# Patient Record
Sex: MALE | Race: WHITE | Employment: FULL TIME | URBAN - METROPOLITAN AREA
[De-identification: names, ages, dates, MRNs, and addresses within clinical notes are randomized per-mention and may not be internally consistent; named-entity substitution may affect disease eponyms.]

---

## 2017-02-27 RX ORDER — AMLODIPINE BESYLATE 2.5 MG/1
TABLET ORAL
Qty: 90 TABLET | Refills: 0 | Status: SHIPPED | OUTPATIENT
Start: 2017-02-27 | End: 2017-03-14

## 2017-03-14 ENCOUNTER — OFFICE VISIT (OUTPATIENT)
Dept: FAMILY MEDICINE CLINIC | Facility: CLINIC | Age: 62
End: 2017-03-14

## 2017-03-14 VITALS
RESPIRATION RATE: 16 BRPM | HEART RATE: 72 BPM | TEMPERATURE: 98 F | DIASTOLIC BLOOD PRESSURE: 60 MMHG | SYSTOLIC BLOOD PRESSURE: 112 MMHG | BODY MASS INDEX: 22 KG/M2 | WEIGHT: 167 LBS

## 2017-03-14 DIAGNOSIS — I10 ESSENTIAL HYPERTENSION WITH GOAL BLOOD PRESSURE LESS THAN 130/85: Primary | ICD-10-CM

## 2017-03-14 DIAGNOSIS — J45.909 MILD ASTHMA: ICD-10-CM

## 2017-03-14 DIAGNOSIS — Z13.0 SCREENING FOR IRON DEFICIENCY ANEMIA: ICD-10-CM

## 2017-03-14 DIAGNOSIS — Z12.5 SPECIAL SCREENING FOR MALIGNANT NEOPLASM OF PROSTATE: ICD-10-CM

## 2017-03-14 DIAGNOSIS — I73.00 RAYNAUD'S DISEASE WITHOUT GANGRENE: ICD-10-CM

## 2017-03-14 DIAGNOSIS — H91.8X3 OTHER SPECIFIED HEARING LOSS OF BOTH EARS: ICD-10-CM

## 2017-03-14 PROBLEM — H91.90 HEARING LOSS: Status: ACTIVE | Noted: 2017-03-14

## 2017-03-14 PROCEDURE — 99214 OFFICE O/P EST MOD 30 MIN: CPT | Performed by: FAMILY MEDICINE

## 2017-03-14 RX ORDER — ALBUTEROL SULFATE 90 UG/1
2 AEROSOL, METERED RESPIRATORY (INHALATION) EVERY 6 HOURS PRN
Qty: 1 INHALER | Refills: 1 | Status: SHIPPED | OUTPATIENT
Start: 2017-03-14 | End: 2017-12-26

## 2017-03-14 RX ORDER — AMLODIPINE BESYLATE 2.5 MG/1
2.5 TABLET ORAL
Qty: 90 TABLET | Refills: 1 | Status: SHIPPED | OUTPATIENT
Start: 2017-03-14 | End: 2017-10-16

## 2017-03-14 NOTE — PROGRESS NOTES
Abhay Gomez is a 57year old male. HPI:   Patient presents for recheck of his hypertension. Pt has been taking amlodipine 2.5 mg regularly. No side effects. Home BP monitoring not being checked. The patient denies chest pain or unusual SOB.   Contin denies shortness of breath with exertion  CARDIOVASCULAR: denies chest pain on exertion  GI: denies abdominal pain and denies heartburn  NEURO: denies headaches    EXAM:   /60 mmHg  Pulse 72  Temp(Src) 97.5 °F (36.4 °C)  Resp 16  Wt 167 lb  GENERAL:

## 2017-05-13 ENCOUNTER — OFFICE VISIT (OUTPATIENT)
Dept: FAMILY MEDICINE CLINIC | Facility: CLINIC | Age: 62
End: 2017-05-13

## 2017-05-13 VITALS
DIASTOLIC BLOOD PRESSURE: 80 MMHG | RESPIRATION RATE: 19 BRPM | TEMPERATURE: 98 F | SYSTOLIC BLOOD PRESSURE: 136 MMHG | OXYGEN SATURATION: 98 % | HEART RATE: 62 BPM | BODY MASS INDEX: 21.2 KG/M2 | HEIGHT: 73 IN | WEIGHT: 160 LBS

## 2017-05-13 DIAGNOSIS — J45.901 ASTHMA EXACERBATION: Primary | ICD-10-CM

## 2017-05-13 DIAGNOSIS — H10.13 ALLERGIC CONJUNCTIVITIS, BILATERAL: ICD-10-CM

## 2017-05-13 PROCEDURE — 99213 OFFICE O/P EST LOW 20 MIN: CPT | Performed by: PHYSICIAN ASSISTANT

## 2017-05-13 RX ORDER — PREDNISONE 20 MG/1
40 TABLET ORAL DAILY
Qty: 10 TABLET | Refills: 0 | Status: SHIPPED | OUTPATIENT
Start: 2017-05-13 | End: 2017-05-20

## 2017-05-13 NOTE — PATIENT INSTRUCTIONS
Try OTC Zaditor eyedrops for the next couple weeks until symptoms resolved  Please follow up with your PCP if no improvement within 3-5 days. Go directly to the ER for any acute worsening of symptoms.      Asthma (Adult)  Asthma is a disease where the mediu Follow up with your healthcare provider, or as advised. Always bring all of your current medicines to any appointments with your healthcare provider. Also bring a complete list of medications even those not taken for asthma.  If you do not already have one,

## 2017-05-13 NOTE — PROGRESS NOTES
HPI:     Patient presents with:  Cough: asthmatic, Tues night     The patient presents for exacerbation of asthma sx's. Lately the patient's asthma has been  under good control. Only has albuterol inhaler to use as needed. Symptoms flared up 4 days ago. Colon   • Cancer Other      family hx, Colon   • Cancer Mother      Liver        Smoking Status: Never Smoker                      Smokeless Status: Never Used                        Alcohol Use: No                  REVIEW OF SYSTEMS:   GENERAL: feels we The patient indicates understanding of these issues and agrees to the plan. The patient is asked to follow up with PCP in one week if sx's persist or to ER if symptoms worsen.     Signed Prescriptions Disp Refills    predniSONE 20 MG Oral Tab 10 tablet 0 · Some people with asthma have worsening of their symptoms when they take aspirin and non-steroidal or fever-reducing medicines like ibuprofen and naproxen. Talk to your healthcare provider if you think this may apply to you.   Follow-up care  Follow up wit

## 2017-05-15 ENCOUNTER — OFFICE VISIT (OUTPATIENT)
Dept: FAMILY MEDICINE CLINIC | Facility: CLINIC | Age: 62
End: 2017-05-15

## 2017-05-15 VITALS
BODY MASS INDEX: 21.2 KG/M2 | DIASTOLIC BLOOD PRESSURE: 88 MMHG | SYSTOLIC BLOOD PRESSURE: 134 MMHG | HEART RATE: 68 BPM | HEIGHT: 73 IN | WEIGHT: 160 LBS | RESPIRATION RATE: 16 BRPM | TEMPERATURE: 97 F

## 2017-05-15 DIAGNOSIS — J45.901 ASTHMA FLARE: Primary | ICD-10-CM

## 2017-05-15 PROCEDURE — 99213 OFFICE O/P EST LOW 20 MIN: CPT | Performed by: FAMILY MEDICINE

## 2017-05-15 NOTE — PROGRESS NOTES
Agus Camarillo is a 57year old male. HPI:   Patient is here for follow-up after his recent visit to a walk-in clinic for his asthma. He was seen there on 5/13/17. He was placed on a prednisone pack. Symptoms flared up 4 days ago previous.   He states th rashes,no suspicious lesions  HEENT: atraumatic, normocephalic,ears and throat are clear  NECK: supple,no adenopathy,no bruits  LUNGS: Diffuse end expiratory wheezes noted  CARDIO: RRR without murmur  GI: good BS's,no masses, HSM or tenderness  EXTREMITIES

## 2017-05-23 ENCOUNTER — OFFICE VISIT (OUTPATIENT)
Dept: FAMILY MEDICINE CLINIC | Facility: CLINIC | Age: 62
End: 2017-05-23

## 2017-05-23 VITALS
DIASTOLIC BLOOD PRESSURE: 84 MMHG | BODY MASS INDEX: 21 KG/M2 | RESPIRATION RATE: 16 BRPM | SYSTOLIC BLOOD PRESSURE: 132 MMHG | WEIGHT: 161 LBS | OXYGEN SATURATION: 95 % | TEMPERATURE: 97 F | HEART RATE: 69 BPM

## 2017-05-23 DIAGNOSIS — J45.901 ASTHMA EXACERBATION: Primary | ICD-10-CM

## 2017-05-23 PROCEDURE — 99213 OFFICE O/P EST LOW 20 MIN: CPT | Performed by: FAMILY MEDICINE

## 2017-05-23 NOTE — PATIENT INSTRUCTIONS
Asthma (Adult)  Asthma is a disease where the medium and  small air passages within the lung go into spasm and restrict the flow of air. Inflammation and swelling of the airways cause further restriction.  During an acute asthma attack, these factors caus A pneumococcal (pneumonia) vaccine and yearly flu shot (every fall) are recommended. Ask your doctor about this.   When to seek medical advice  Call your healthcare provider right away if any of these occur:   · Increased wheezing or shortness of breath  ·

## 2017-05-23 NOTE — PROGRESS NOTES
HPI:   Winston Aly is a 57year old male who presents for upper respiratory symptoms for the past few weeks. Patient reports that he is feeling better after finishing prednisone and continuing on with Asmanex and albuterol as needed.   He does not feel m cough  CARDIOVASCULAR: denies chest pain on exertion  GI: no nausea or abdominal pain  NEURO: denies headaches    EXAM:   /84 mmHg  Pulse 69  Temp(Src) 96.9 °F (36.1 °C) (Oral)  Resp 16  Wt 161 lb  SpO2 95%  GENERAL: well developed, well nourished,in

## 2017-05-31 RX ORDER — AMLODIPINE BESYLATE 2.5 MG/1
TABLET ORAL
Qty: 90 TABLET | Refills: 0 | Status: SHIPPED | OUTPATIENT
Start: 2017-05-31 | End: 2017-06-26

## 2017-06-26 ENCOUNTER — OFFICE VISIT (OUTPATIENT)
Dept: FAMILY MEDICINE CLINIC | Facility: CLINIC | Age: 62
End: 2017-06-26

## 2017-06-26 VITALS
BODY MASS INDEX: 22 KG/M2 | SYSTOLIC BLOOD PRESSURE: 132 MMHG | DIASTOLIC BLOOD PRESSURE: 88 MMHG | HEIGHT: 73 IN | WEIGHT: 166 LBS | RESPIRATION RATE: 16 BRPM | HEART RATE: 60 BPM | TEMPERATURE: 97 F

## 2017-06-26 DIAGNOSIS — J45.909 MILD ASTHMA: Primary | ICD-10-CM

## 2017-06-26 PROCEDURE — 99214 OFFICE O/P EST MOD 30 MIN: CPT | Performed by: FAMILY MEDICINE

## 2017-06-26 NOTE — PROGRESS NOTES
HPI:   Stormy Lucia is a 57year old male who presents for upper respiratory symptoms for the past few weeks. Patient reports that he is feeling much better the Asmanex BID and albuterol as needed. He is unsure what triggers his asthma attack.   He gabrielle 132/88   Pulse 60   Temp (!) 97.2 °F (36.2 °C)   Resp 16   Ht 73\"   Wt 166 lb   BMI 21.90 kg/m²   GENERAL: well developed, well nourished,in no apparent distress  SKIN: no rashes,no suspicious lesions  EYES:PERRL, EOMI,conjunctiva are clear  HEENT: atraum

## 2017-08-30 RX ORDER — AMLODIPINE BESYLATE 2.5 MG/1
TABLET ORAL
Qty: 90 TABLET | Refills: 0 | Status: SHIPPED | OUTPATIENT
Start: 2017-08-30 | End: 2018-06-04

## 2017-09-08 ENCOUNTER — TELEPHONE (OUTPATIENT)
Dept: FAMILY MEDICINE CLINIC | Facility: CLINIC | Age: 62
End: 2017-09-08

## 2017-09-08 NOTE — TELEPHONE ENCOUNTER
LM to CB - please inquire if pt has had a colonoscopy in the last 10 years and if so who performed it (name and phone #). Please let Monik Miles know so I can obtain the report.     If pt did not have a colonoscopy please advise them we will leave the FIT stoo

## 2017-10-12 ENCOUNTER — TELEPHONE (OUTPATIENT)
Dept: FAMILY MEDICINE CLINIC | Facility: CLINIC | Age: 62
End: 2017-10-12

## 2017-10-12 DIAGNOSIS — Z80.0 FAMILY HISTORY OF COLON CANCER: Primary | ICD-10-CM

## 2017-10-12 NOTE — TELEPHONE ENCOUNTER
Message received from [address] [place] [address] [employer] today: (INTERNAL)    Reason for the order/referral: colonoscopy consult   PCP: Dr. Regina Nur   Refer to Provider (first and last name): Airam Taylor   Specialty: gastroenterology   Patient Insurance: Payor

## 2017-10-12 NOTE — TELEPHONE ENCOUNTER
Spoke w/Patient gave him phone number to centralized Referral to begin process. Patient sees Dr. Mikael Aviles. Thank you.

## 2017-10-16 ENCOUNTER — OFFICE VISIT (OUTPATIENT)
Dept: FAMILY MEDICINE CLINIC | Facility: CLINIC | Age: 62
End: 2017-10-16

## 2017-10-16 VITALS
SYSTOLIC BLOOD PRESSURE: 132 MMHG | WEIGHT: 162 LBS | RESPIRATION RATE: 16 BRPM | HEART RATE: 60 BPM | TEMPERATURE: 97 F | DIASTOLIC BLOOD PRESSURE: 78 MMHG | HEIGHT: 73 IN | BODY MASS INDEX: 21.47 KG/M2

## 2017-10-16 DIAGNOSIS — I10 ESSENTIAL HYPERTENSION: ICD-10-CM

## 2017-10-16 DIAGNOSIS — Z23 NEED FOR VACCINATION: ICD-10-CM

## 2017-10-16 DIAGNOSIS — I73.00 RAYNAUD'S DISEASE WITHOUT GANGRENE: ICD-10-CM

## 2017-10-16 DIAGNOSIS — Z00.00 ANNUAL PHYSICAL EXAM: Primary | ICD-10-CM

## 2017-10-16 DIAGNOSIS — J45.30 MILD PERSISTENT ASTHMA WITHOUT COMPLICATION: ICD-10-CM

## 2017-10-16 PROCEDURE — 90472 IMMUNIZATION ADMIN EACH ADD: CPT | Performed by: FAMILY MEDICINE

## 2017-10-16 PROCEDURE — 90471 IMMUNIZATION ADMIN: CPT | Performed by: FAMILY MEDICINE

## 2017-10-16 PROCEDURE — 90732 PPSV23 VACC 2 YRS+ SUBQ/IM: CPT | Performed by: FAMILY MEDICINE

## 2017-10-16 PROCEDURE — 99396 PREV VISIT EST AGE 40-64: CPT | Performed by: FAMILY MEDICINE

## 2017-10-16 PROCEDURE — 90686 IIV4 VACC NO PRSV 0.5 ML IM: CPT | Performed by: FAMILY MEDICINE

## 2017-10-16 NOTE — PROGRESS NOTES
Stormy Lucia is a 57year old male who presents for a complete physical exam.   HPI:   Pt complains of nothing specific. Has hx of asthma, doing well on his Asmanex 220 mcg 1 puff a day. Has a history of Raynaud's phenomenon, mainly in the winter.  Arturo syndrome    • Unspecified essential hypertension        PAST SURGICAL HISTORY:   History reviewed. No pertinent surgical history.     FAMILY HISTORY:     Family History   Problem Relation Age of Onset   • Heart Attack Father    • Breast Cancer Sister    • B Pulse exam Left:    pedal 2+.    Pretibial edema: No lymphadema in the legs or feet:  GI: good BS's,no masses, HSM or tenderness  : two descended testes,no masses,no hernia,no penile lesions  RECTAL: good rectal tone, prostate shows no masses, stool is

## 2017-10-23 NOTE — TELEPHONE ENCOUNTER
Please call pt to see if he completed his colonoscopy yet. If so then we need a copy of the report. Please find out who performed the test and let macy know.   Thanks

## 2017-11-24 ENCOUNTER — LAB ENCOUNTER (OUTPATIENT)
Dept: LAB | Age: 62
End: 2017-11-24
Attending: FAMILY MEDICINE
Payer: COMMERCIAL

## 2017-11-24 DIAGNOSIS — I10 ESSENTIAL HYPERTENSION WITH GOAL BLOOD PRESSURE LESS THAN 130/85: ICD-10-CM

## 2017-11-24 DIAGNOSIS — Z13.0 SCREENING FOR IRON DEFICIENCY ANEMIA: ICD-10-CM

## 2017-11-24 DIAGNOSIS — Z12.5 SPECIAL SCREENING FOR MALIGNANT NEOPLASM OF PROSTATE: ICD-10-CM

## 2017-11-24 PROCEDURE — 80053 COMPREHEN METABOLIC PANEL: CPT

## 2017-11-24 PROCEDURE — 80061 LIPID PANEL: CPT

## 2017-11-24 PROCEDURE — 36415 COLL VENOUS BLD VENIPUNCTURE: CPT

## 2017-11-24 PROCEDURE — 85025 COMPLETE CBC W/AUTO DIFF WBC: CPT

## 2017-11-24 PROCEDURE — 84153 ASSAY OF PSA TOTAL: CPT

## 2017-11-27 ENCOUNTER — TELEPHONE (OUTPATIENT)
Dept: FAMILY MEDICINE CLINIC | Facility: CLINIC | Age: 62
End: 2017-11-27

## 2017-11-27 DIAGNOSIS — E87.5 HYPERKALEMIA: Primary | ICD-10-CM

## 2017-11-28 DIAGNOSIS — J45.901 ASTHMA FLARE: ICD-10-CM

## 2017-12-01 NOTE — TELEPHONE ENCOUNTER
From: Greg Clayton  Sent: 11/28/2017 4:26 AM CST  Subject: Medication Renewal Request    Greg Clayton would like a refill of the following medications:     Mometasone Furoate (ASMANEX 60 METERED DOSES) 220 MCG/INH Inhalation Aerosol Powder, Breath Act

## 2017-12-05 ENCOUNTER — PATIENT MESSAGE (OUTPATIENT)
Dept: FAMILY MEDICINE CLINIC | Facility: CLINIC | Age: 62
End: 2017-12-05

## 2017-12-06 NOTE — TELEPHONE ENCOUNTER
From: Maynor Ennis  To: Caden Arthur MD  Sent: 12/5/2017 4:37 PM CST  Subject: Other    I am scheduled for a colonoscopy with Dr. Eulalia Saldaña on 1-.  I received a message from Dr. Jeremy Meraz office stating that they had not received my updated medi

## 2017-12-25 ENCOUNTER — PATIENT MESSAGE (OUTPATIENT)
Dept: FAMILY MEDICINE CLINIC | Facility: CLINIC | Age: 62
End: 2017-12-25

## 2017-12-26 RX ORDER — ALBUTEROL SULFATE 90 UG/1
2 AEROSOL, METERED RESPIRATORY (INHALATION) EVERY 6 HOURS PRN
Qty: 1 INHALER | Refills: 2 | Status: SHIPPED | OUTPATIENT
Start: 2017-12-26 | End: 2018-06-04

## 2017-12-26 NOTE — TELEPHONE ENCOUNTER
Pt was advised that he should schedule an appointment if symptoms continue to get more frequent. Please see pended order.   Thank you

## 2017-12-26 NOTE — TELEPHONE ENCOUNTER
From: Valeria Kaur  To: Damian Lloyd MD  Sent: 12/25/2017 3:51 PM CST  Subject: Prescription Question    I am requesting a prescription refill on my 'rescue' inhaler - Ventolin HFA.  Due to having a slight flu, cold weather, and indoor cooking, I h

## 2018-01-12 ENCOUNTER — LABORATORY ENCOUNTER (OUTPATIENT)
Dept: LAB | Age: 63
End: 2018-01-12
Attending: INTERNAL MEDICINE
Payer: COMMERCIAL

## 2018-01-12 DIAGNOSIS — Z12.11 SPECIAL SCREENING FOR MALIGNANT NEOPLASMS, COLON: Primary | ICD-10-CM

## 2018-01-12 DIAGNOSIS — Z80.0 FAMILY HISTORY OF MALIGNANT NEOPLASM OF DIGESTIVE ORGAN: ICD-10-CM

## 2018-01-12 PROCEDURE — 88305 TISSUE EXAM BY PATHOLOGIST: CPT

## 2018-02-01 DIAGNOSIS — J45.901 ASTHMA FLARE: ICD-10-CM

## 2018-02-01 RX ORDER — MOMETASONE FUROATE 220 UG/1
INHALANT RESPIRATORY (INHALATION)
Qty: 1 INHALER | Refills: 2 | Status: SHIPPED | OUTPATIENT
Start: 2018-02-01 | End: 2018-06-04

## 2018-05-29 DIAGNOSIS — I10 ESSENTIAL HYPERTENSION WITH GOAL BLOOD PRESSURE LESS THAN 130/85: ICD-10-CM

## 2018-05-29 NOTE — TELEPHONE ENCOUNTER
Pt needs appt with Dr Petros Jones for htn before any refills. Please have them call to schedule.  Thanks

## 2018-06-04 DIAGNOSIS — J45.901 ASTHMA FLARE: ICD-10-CM

## 2018-06-05 NOTE — TELEPHONE ENCOUNTER
Please call pt. He needs to see Dr. Eileen Smith for a follow up HTN visit. He was due to be seen in 10/16/17.

## 2018-06-07 ENCOUNTER — OFFICE VISIT (OUTPATIENT)
Dept: FAMILY MEDICINE CLINIC | Facility: CLINIC | Age: 63
End: 2018-06-07

## 2018-06-07 VITALS
SYSTOLIC BLOOD PRESSURE: 104 MMHG | WEIGHT: 163 LBS | DIASTOLIC BLOOD PRESSURE: 74 MMHG | HEART RATE: 72 BPM | RESPIRATION RATE: 16 BRPM | HEIGHT: 73 IN | TEMPERATURE: 97 F | BODY MASS INDEX: 21.6 KG/M2

## 2018-06-07 DIAGNOSIS — J45.30 MILD PERSISTENT ASTHMA WITHOUT COMPLICATION: ICD-10-CM

## 2018-06-07 DIAGNOSIS — H91.8X3 OTHER SPECIFIED HEARING LOSS OF BOTH EARS: ICD-10-CM

## 2018-06-07 DIAGNOSIS — I10 ESSENTIAL HYPERTENSION: Primary | ICD-10-CM

## 2018-06-07 DIAGNOSIS — Z12.5 SCREENING FOR PROSTATE CANCER: ICD-10-CM

## 2018-06-07 PROCEDURE — 99214 OFFICE O/P EST MOD 30 MIN: CPT | Performed by: FAMILY MEDICINE

## 2018-06-07 RX ORDER — AMLODIPINE BESYLATE 2.5 MG/1
TABLET ORAL
Qty: 90 TABLET | Refills: 0 | Status: SHIPPED | OUTPATIENT
Start: 2018-06-07 | End: 2018-10-19

## 2018-06-07 RX ORDER — ALBUTEROL SULFATE 90 UG/1
2 AEROSOL, METERED RESPIRATORY (INHALATION) EVERY 6 HOURS PRN
Qty: 1 INHALER | Refills: 2 | Status: SHIPPED
Start: 2018-06-07

## 2018-06-07 RX ORDER — AMLODIPINE BESYLATE 2.5 MG/1
2.5 TABLET ORAL
Qty: 90 TABLET | Refills: 0 | Status: SHIPPED
Start: 2018-06-07 | End: 2018-06-07

## 2018-06-07 NOTE — PROGRESS NOTES
Cherelle Ragland is a 60year old male. HPI:   Patient presents for recheck of his hypertension. Pt has been taking amlodipine 2.5 mg regularly. No side effects. Home BP monitoring not being checked. The patient denies chest pain or unusual SOB.   Contin Past Medical History:   Diagnosis Date   • Hearing loss    • Raynaud's syndrome    • Unspecified essential hypertension    • Wears glasses       No past surgical history on file.    Social History:    Smoking status: Never Smoker

## 2018-06-10 PROBLEM — J45.901 ASTHMA FLARE: Status: RESOLVED | Noted: 2017-05-15 | Resolved: 2018-06-10

## 2018-06-10 PROBLEM — J45.30 MILD PERSISTENT ASTHMA WITHOUT COMPLICATION (HCC): Status: ACTIVE | Noted: 2017-03-14

## 2018-06-10 PROBLEM — J45.901 ASTHMA FLARE (HCC): Status: RESOLVED | Noted: 2017-05-15 | Resolved: 2018-06-10

## 2018-06-10 PROBLEM — J45.30 MILD PERSISTENT ASTHMA WITHOUT COMPLICATION: Status: ACTIVE | Noted: 2017-03-14

## 2018-07-09 RX ORDER — AMLODIPINE BESYLATE 2.5 MG/1
TABLET ORAL
Qty: 90 TABLET | Refills: 0 | OUTPATIENT
Start: 2018-07-09

## 2018-07-09 NOTE — TELEPHONE ENCOUNTER
Order   AMLODIPINE BESYLATE 2.5 MG Oral Tab [[id number]] (Order [id number])   Patient Information     Patient Name  Lyndsey Baker MRN  [id number] Sex  Male   1955   Order Information     Ordered Status Priority Ordering User Department   18 Gail Chacon confirmed by pharmacy (6/7/2018  3:10 PM CDT)    Print Trail   Printed On Printed By Printed To Report   6/7/18 3:10 PM Josh Garnett, JAMES [facility] REPORT

## 2018-09-10 DIAGNOSIS — I10 ESSENTIAL HYPERTENSION WITH GOAL BLOOD PRESSURE LESS THAN 130/85: ICD-10-CM

## 2018-09-10 RX ORDER — AMLODIPINE BESYLATE 2.5 MG/1
2.5 TABLET ORAL
Qty: 90 TABLET | Refills: 0
Start: 2018-09-10

## 2018-09-10 RX ORDER — AMLODIPINE BESYLATE 2.5 MG/1
TABLET ORAL
Qty: 90 TABLET | Refills: 0 | Status: SHIPPED | OUTPATIENT
Start: 2018-09-10 | End: 2018-10-01

## 2018-09-28 ENCOUNTER — HOSPITAL ENCOUNTER (OUTPATIENT)
Age: 63
Discharge: HOME | End: 2018-09-28
Payer: COMMERCIAL

## 2018-09-28 VITALS
RESPIRATION RATE: 20 BRPM | TEMPERATURE: 98 F | SYSTOLIC BLOOD PRESSURE: 149 MMHG | DIASTOLIC BLOOD PRESSURE: 82 MMHG | OXYGEN SATURATION: 98 % | HEART RATE: 61 BPM

## 2018-09-28 DIAGNOSIS — J45.21 MILD INTERMITTENT ASTHMA WITH EXACERBATION: Primary | ICD-10-CM

## 2018-09-28 DIAGNOSIS — I49.3 PVC (PREMATURE VENTRICULAR CONTRACTION): ICD-10-CM

## 2018-09-28 DIAGNOSIS — I49.1 PAC (PREMATURE ATRIAL CONTRACTION): ICD-10-CM

## 2018-09-28 PROCEDURE — 94640 AIRWAY INHALATION TREATMENT: CPT

## 2018-09-28 PROCEDURE — 99204 OFFICE O/P NEW MOD 45 MIN: CPT

## 2018-09-28 PROCEDURE — 99214 OFFICE O/P EST MOD 30 MIN: CPT

## 2018-09-28 PROCEDURE — 93010 ELECTROCARDIOGRAM REPORT: CPT

## 2018-09-28 PROCEDURE — 93005 ELECTROCARDIOGRAM TRACING: CPT

## 2018-09-28 RX ORDER — IPRATROPIUM BROMIDE AND ALBUTEROL SULFATE 2.5; .5 MG/3ML; MG/3ML
3 SOLUTION RESPIRATORY (INHALATION) ONCE
Status: COMPLETED | OUTPATIENT
Start: 2018-09-28 | End: 2018-09-28

## 2018-09-28 RX ORDER — PREDNISONE 20 MG/1
60 TABLET ORAL ONCE
Status: COMPLETED | OUTPATIENT
Start: 2018-09-28 | End: 2018-09-28

## 2018-09-28 RX ORDER — PREDNISONE 20 MG/1
40 TABLET ORAL DAILY
Qty: 10 TABLET | Refills: 0 | Status: SHIPPED | OUTPATIENT
Start: 2018-09-28 | End: 2018-10-03

## 2018-09-28 NOTE — ED INITIAL ASSESSMENT (HPI)
Complains of shortness of breath the last two weeks but got worse last night. Was unable to sleep last night due to wheezing.

## 2018-09-29 NOTE — ED PROVIDER NOTES
Patient Seen in: [facility] In [place]    History   Patient presents with:  Shortness Of Breath  Rash Skin Problem (integumentary)    Stated Complaint: short of breath x 1 day hx of asthma    HPI    Chet Alves is a 65-year-old male who comes in Conjunctivae are normal. Pupils are equal, round, and reactive to light. Neck: Normal range of motion. Cardiovascular: Normal rate, normal heart sounds and intact distal pulses. An irregular rhythm present.    has an irregular rhythm and heart rate is v today and remains so upon discharge.  I discuss the plan of care with the patient, who expresses understanding.  All questions and concerns are addressed to the patient's satisfaction prior to discharge today.           Disposition and Plan     Clinical Imp

## 2018-10-01 ENCOUNTER — OFFICE VISIT (OUTPATIENT)
Dept: FAMILY MEDICINE CLINIC | Facility: CLINIC | Age: 63
End: 2018-10-01

## 2018-10-01 VITALS
RESPIRATION RATE: 16 BRPM | WEIGHT: 165 LBS | BODY MASS INDEX: 21.87 KG/M2 | DIASTOLIC BLOOD PRESSURE: 70 MMHG | OXYGEN SATURATION: 98 % | HEART RATE: 60 BPM | HEIGHT: 73 IN | SYSTOLIC BLOOD PRESSURE: 100 MMHG | TEMPERATURE: 98 F

## 2018-10-01 DIAGNOSIS — J45.31 MILD PERSISTENT ASTHMA WITH ACUTE EXACERBATION: ICD-10-CM

## 2018-10-01 DIAGNOSIS — Z09 FOLLOW UP: ICD-10-CM

## 2018-10-01 DIAGNOSIS — R94.31 ABNORMAL EKG: Primary | ICD-10-CM

## 2018-10-01 PROCEDURE — 99213 OFFICE O/P EST LOW 20 MIN: CPT | Performed by: NURSE PRACTITIONER

## 2018-10-01 NOTE — PROGRESS NOTES
Luciana Hutson is a 60year old male. HPI:   Patient presents today for a immediate care follow-up. Patient was seen at immediate care on 9- with a chief complaint of shortness of breath. Patient was noted to be having an asthma exacerbation.   Renzo Haynes Alcohol use: No    Drug use: No       REVIEW OF SYSTEMS:   GENERAL HEALTH: feels well otherwise  HEENT: denies sinus pressure, sinus congestion, sore throat or ear pain.   RESPIRATORY: denies shortness of breath with exertion- reports the shortness of breat STRESS(CPT=93350/50452 [facility] [address]); Future    2. Follow up  As detailed below. 3. Mild persistent asthma with acute exacerbation  Finish course of prednisone. Patient reminded to take medication with food. Continue Asmanex and Ventolin as ordered.   Mon

## 2018-10-11 ENCOUNTER — HOSPITAL ENCOUNTER (OUTPATIENT)
Dept: CV DIAGNOSTICS | Facility: HOSPITAL | Age: 63
Discharge: HOME | End: 2018-10-11
Attending: NURSE PRACTITIONER
Payer: COMMERCIAL

## 2018-10-11 ENCOUNTER — TELEPHONE (OUTPATIENT)
Dept: FAMILY MEDICINE CLINIC | Facility: CLINIC | Age: 63
End: 2018-10-11

## 2018-10-11 DIAGNOSIS — R94.31 ABNORMAL EKG: ICD-10-CM

## 2018-10-11 DIAGNOSIS — I49.3 PVC'S (PREMATURE VENTRICULAR CONTRACTIONS): ICD-10-CM

## 2018-10-11 DIAGNOSIS — R94.31 ABNORMAL EKG: Primary | ICD-10-CM

## 2018-10-11 PROCEDURE — 93350 STRESS TTE ONLY: CPT | Performed by: NURSE PRACTITIONER

## 2018-10-11 PROCEDURE — 93018 CV STRESS TEST I&R ONLY: CPT | Performed by: NURSE PRACTITIONER

## 2018-10-11 PROCEDURE — 93017 CV STRESS TEST TRACING ONLY: CPT | Performed by: NURSE PRACTITIONER

## 2018-10-11 NOTE — PROGRESS NOTES
CARDIAC DIAGNOSTICS PRELIMINARY REPORT    No ST changes noted before, during, or after exercise. Rest: heart rate was 62 BPM, blood pressure was 144/92 mmHg, EKG showed sinus arrhythmia (PACs and PVCs).     Patient exercised for 11:00 minutes on a Alin

## 2018-10-11 NOTE — TELEPHONE ENCOUNTER
Zhihu called from [address] [facility] and states pt had stress echocardiogram today and had a lot of PVCs, B/P reads increased after 8 minutes  (148/114), after 20 mintues 140/92, SVT following exercise and B/Ps increased to upper 160s.   Per Zhihu they will be fa

## 2018-10-12 ENCOUNTER — LAB ENCOUNTER (OUTPATIENT)
Dept: LAB | Age: 63
End: 2018-10-12
Attending: FAMILY MEDICINE
Payer: COMMERCIAL

## 2018-10-12 ENCOUNTER — PRIOR ORIGINAL RECORDS (OUTPATIENT)
Dept: OTHER | Age: 63
End: 2018-10-12

## 2018-10-12 ENCOUNTER — OFFICE VISIT (OUTPATIENT)
Dept: FAMILY MEDICINE CLINIC | Facility: CLINIC | Age: 63
End: 2018-10-12

## 2018-10-12 VITALS
HEART RATE: 77 BPM | DIASTOLIC BLOOD PRESSURE: 76 MMHG | SYSTOLIC BLOOD PRESSURE: 130 MMHG | TEMPERATURE: 98 F | WEIGHT: 161 LBS | OXYGEN SATURATION: 96 % | BODY MASS INDEX: 21.34 KG/M2 | HEIGHT: 73 IN | RESPIRATION RATE: 16 BRPM

## 2018-10-12 DIAGNOSIS — J45.41 MODERATE PERSISTENT ASTHMA WITH EXACERBATION: Primary | ICD-10-CM

## 2018-10-12 DIAGNOSIS — I10 ESSENTIAL HYPERTENSION: ICD-10-CM

## 2018-10-12 DIAGNOSIS — E87.5 HYPERKALEMIA: ICD-10-CM

## 2018-10-12 DIAGNOSIS — Z12.5 SCREENING FOR PROSTATE CANCER: ICD-10-CM

## 2018-10-12 DIAGNOSIS — I77.810 DILATED AORTIC ROOT (HCC): ICD-10-CM

## 2018-10-12 DIAGNOSIS — J45.30 MILD PERSISTENT ASTHMA WITHOUT COMPLICATION: ICD-10-CM

## 2018-10-12 PROCEDURE — 80053 COMPREHEN METABOLIC PANEL: CPT

## 2018-10-12 PROCEDURE — 85025 COMPLETE CBC W/AUTO DIFF WBC: CPT

## 2018-10-12 PROCEDURE — 84153 ASSAY OF PSA TOTAL: CPT

## 2018-10-12 PROCEDURE — 99214 OFFICE O/P EST MOD 30 MIN: CPT | Performed by: NURSE PRACTITIONER

## 2018-10-12 PROCEDURE — 36415 COLL VENOUS BLD VENIPUNCTURE: CPT

## 2018-10-12 PROCEDURE — 80061 LIPID PANEL: CPT

## 2018-10-12 RX ORDER — PREDNISONE 20 MG/1
20 TABLET ORAL DAILY
Qty: 5 TABLET | Refills: 0 | Status: SHIPPED | OUTPATIENT
Start: 2018-10-12 | End: 2018-10-17

## 2018-10-12 NOTE — TELEPHONE ENCOUNTER
Pt called back. Advised him of below. He voiced understanding. Then states \"while I have you on the phone, I am not sure what to do about my asthma, it is still acting up\"  Reports using his inhalers as rx'd, feels they help 75% with his breathing.  He

## 2018-10-12 NOTE — TELEPHONE ENCOUNTER
LM on preferred # to cb. Referral order placed for Dr Laura Schultz.     Yuriy Barnard MD [address] [facility] [address] [facility]

## 2018-10-13 RX ORDER — BUDESONIDE AND FORMOTEROL FUMARATE DIHYDRATE 160; 4.5 UG/1; UG/1
2 AEROSOL RESPIRATORY (INHALATION) 2 TIMES DAILY
Qty: 1 INHALER | Refills: 0 | COMMUNITY
Start: 2018-10-13 | End: 2018-10-19

## 2018-10-13 NOTE — PROGRESS NOTES
Kell Aguilar is a 60year old male. HPI:   Patient presents today reporting he feels his asthma is not controlled. Patient was seen at immediate care 9/28/18 with shortness of breath and an asthma exacerbation.  Patient was started on a short course of breanna Social History:  Social History    Tobacco Use      Smoking status: Never Smoker      Smokeless tobacco: Never Used    Alcohol use: No    Drug use: No       REVIEW OF SYSTEMS:   GENERAL HEALTH: feels well otherwise  RESPIRATORY: reports shortness of yarelis Tab; Take 1 tablet (20 mg total) by mouth daily for 5 days. Dispense: 5 tablet; Refill: 0  - Budesonide-Formoterol Fumarate (SYMBICORT) 160-4.5 MCG/ACT Inhalation Aerosol; Inhale 2 puffs into the lungs 2 (two) times daily. Dispense: 1 Inhaler;  Refill: 0

## 2018-10-17 ENCOUNTER — PRIOR ORIGINAL RECORDS (OUTPATIENT)
Dept: OTHER | Age: 63
End: 2018-10-17

## 2018-10-17 LAB
ALKALINE PHOSPHATATE(ALK PHOS): 64 IU/L
BILIRUBIN TOTAL: 0.8 MG/DL
BUN: 16 MG/DL
CALCIUM: 9.5 MG/DL
CHLORIDE: 105 MEQ/L
CHOLESTEROL, TOTAL: 161 MG/DL
CREATININE, SERUM: 1 MG/DL
GLUCOSE: 90 MG/DL
HDL CHOLESTEROL: 84 MG/DL
HEMATOCRIT: 51.4 %
HEMOGLOBIN: 16.9 G/DL
LDL CHOLESTEROL: 68 MG/DL
PLATELETS: 198 K/UL
POTASSIUM, SERUM: 5.1 MEQ/L
PROTEIN, TOTAL: 8.1 G/DL
RED BLOOD COUNT: 5.54 X 10-6/U
SGOT (AST): 23 IU/L
SGPT (ALT): 30 IU/L
SODIUM: 137 MEQ/L
TRIGLYCERIDES: 43 MG/DL
WHITE BLOOD COUNT: 11.4 X 10-3/U

## 2018-10-19 ENCOUNTER — OFFICE VISIT (OUTPATIENT)
Dept: FAMILY MEDICINE CLINIC | Facility: CLINIC | Age: 63
End: 2018-10-19

## 2018-10-19 VITALS
SYSTOLIC BLOOD PRESSURE: 120 MMHG | TEMPERATURE: 97 F | HEART RATE: 56 BPM | DIASTOLIC BLOOD PRESSURE: 78 MMHG | WEIGHT: 160 LBS | RESPIRATION RATE: 12 BRPM | BODY MASS INDEX: 21.2 KG/M2 | HEIGHT: 73 IN

## 2018-10-19 DIAGNOSIS — J45.41 MODERATE PERSISTENT ASTHMA WITH EXACERBATION: ICD-10-CM

## 2018-10-19 DIAGNOSIS — Z00.00 ANNUAL PHYSICAL EXAM: Primary | ICD-10-CM

## 2018-10-19 DIAGNOSIS — I10 ESSENTIAL HYPERTENSION WITH GOAL BLOOD PRESSURE LESS THAN 130/85: ICD-10-CM

## 2018-10-19 PROCEDURE — 81003 URINALYSIS AUTO W/O SCOPE: CPT | Performed by: FAMILY MEDICINE

## 2018-10-19 PROCEDURE — 99214 OFFICE O/P EST MOD 30 MIN: CPT | Performed by: FAMILY MEDICINE

## 2018-10-19 RX ORDER — AMLODIPINE BESYLATE 5 MG/1
5 TABLET ORAL
Qty: 30 TABLET | Refills: 5 | Status: SHIPPED | OUTPATIENT
Start: 2018-10-19 | End: 2019-09-10

## 2018-10-19 RX ORDER — BUDESONIDE AND FORMOTEROL FUMARATE DIHYDRATE 160; 4.5 UG/1; UG/1
2 AEROSOL RESPIRATORY (INHALATION) 2 TIMES DAILY
Qty: 1 INHALER | Refills: 5 | Status: SHIPPED | OUTPATIENT
Start: 2018-10-19 | End: 2019-04-15

## 2018-10-19 NOTE — PROGRESS NOTES
Juliano Poole is a 60year old male who presents for a complete physical exam.   HPI:   Pt complains of nothing specific. Has hx of asthma, doing well on his Symbicort 160-4.5 MCG/ACT 2 puffs twice daily.  Has a history of Raynaud's phenomenon, mainly in th Inhalation Aerosol Inhale 2 puffs into the lungs 2 (two) times daily. Disp: 1 Inhaler Rfl: 5   Albuterol Sulfate  (90 Base) MCG/ACT Inhalation Aero Soln Inhale 2 puffs into the lungs every 6 (six) hours as needed for Wheezing or Shortness of Breath. apparent distress  SKIN: Raised pruritic macules noted over the chest and arms  HEENT: atraumatic, normocephalic,ears and throat are clear.  Patient has had a history of hearing loss  EYES:PERRLA, EOMI, normal optic disk,conjunctiva are clear  NECK: supple,

## 2018-10-26 ENCOUNTER — HOSPITAL ENCOUNTER (OUTPATIENT)
Dept: CV DIAGNOSTICS | Facility: HOSPITAL | Age: 63
Discharge: HOME | End: 2018-10-26
Attending: INTERNAL MEDICINE
Payer: COMMERCIAL

## 2018-10-26 DIAGNOSIS — I47.1 PAROXYSMAL SUPRAVENTRICULAR TACHYCARDIA (HCC): ICD-10-CM

## 2018-10-26 PROCEDURE — 93226 XTRNL ECG REC<48 HR SCAN A/R: CPT | Performed by: INTERNAL MEDICINE

## 2018-10-26 PROCEDURE — 93225 XTRNL ECG REC<48 HRS REC: CPT | Performed by: INTERNAL MEDICINE

## 2018-10-26 PROCEDURE — 93227 XTRNL ECG REC<48 HR R&I: CPT | Performed by: INTERNAL MEDICINE

## 2018-11-01 ENCOUNTER — PRIOR ORIGINAL RECORDS (OUTPATIENT)
Dept: OTHER | Age: 63
End: 2018-11-01

## 2018-11-13 ENCOUNTER — HOSPITAL ENCOUNTER (OUTPATIENT)
Dept: CT IMAGING | Facility: HOSPITAL | Age: 63
Discharge: HOME | End: 2018-11-13
Attending: INTERNAL MEDICINE
Payer: COMMERCIAL

## 2018-11-13 ENCOUNTER — HOSPITAL ENCOUNTER (OUTPATIENT)
Dept: CV DIAGNOSTICS | Facility: HOSPITAL | Age: 63
Discharge: HOME | End: 2018-11-13
Attending: NURSE PRACTITIONER
Payer: COMMERCIAL

## 2018-11-13 DIAGNOSIS — I77.810 DILATED AORTIC ROOT (HCC): ICD-10-CM

## 2018-11-13 DIAGNOSIS — I71.2 THORACIC ASCENDING AORTIC ANEURYSM (HCC): ICD-10-CM

## 2018-11-13 PROCEDURE — 82565 ASSAY OF CREATININE: CPT

## 2018-11-13 PROCEDURE — 93306 TTE W/DOPPLER COMPLETE: CPT | Performed by: NURSE PRACTITIONER

## 2018-11-13 PROCEDURE — 71275 CT ANGIOGRAPHY CHEST: CPT | Performed by: INTERNAL MEDICINE

## 2018-11-15 ENCOUNTER — PRIOR ORIGINAL RECORDS (OUTPATIENT)
Dept: OTHER | Age: 63
End: 2018-11-15

## 2018-11-28 ENCOUNTER — MYAURORA ACCOUNT LINK (OUTPATIENT)
Dept: OTHER | Age: 63
End: 2018-11-28

## 2018-11-28 ENCOUNTER — PRIOR ORIGINAL RECORDS (OUTPATIENT)
Dept: OTHER | Age: 63
End: 2018-11-28

## 2018-12-13 RX ORDER — AMLODIPINE BESYLATE 2.5 MG/1
TABLET ORAL
Qty: 90 TABLET | Refills: 0 | OUTPATIENT
Start: 2018-12-13

## 2019-02-28 VITALS
SYSTOLIC BLOOD PRESSURE: 138 MMHG | HEIGHT: 72 IN | DIASTOLIC BLOOD PRESSURE: 88 MMHG | HEART RATE: 60 BPM | BODY MASS INDEX: 21.81 KG/M2 | WEIGHT: 161 LBS

## 2019-02-28 VITALS — WEIGHT: 163 LBS | SYSTOLIC BLOOD PRESSURE: 128 MMHG | DIASTOLIC BLOOD PRESSURE: 80 MMHG | HEART RATE: 68 BPM

## 2019-04-02 RX ORDER — BUDESONIDE AND FORMOTEROL FUMARATE DIHYDRATE 160; 4.5 UG/1; UG/1
AEROSOL RESPIRATORY (INHALATION)
COMMUNITY
Start: 2018-10-17 | End: 2019-06-12 | Stop reason: ALTCHOICE

## 2019-04-02 RX ORDER — ALBUTEROL SULFATE 90 UG/1
AEROSOL, METERED RESPIRATORY (INHALATION)
COMMUNITY
Start: 2018-10-17

## 2019-04-02 RX ORDER — DILTIAZEM HYDROCHLORIDE 120 MG/1
CAPSULE, COATED, EXTENDED RELEASE ORAL
COMMUNITY
Start: 2019-02-18 | End: 2019-11-08 | Stop reason: SDUPTHER

## 2019-04-02 RX ORDER — LOSARTAN POTASSIUM 25 MG/1
TABLET ORAL
COMMUNITY
Start: 2018-11-28 | End: 2019-11-09 | Stop reason: SDUPTHER

## 2019-04-15 DIAGNOSIS — J45.41 MODERATE PERSISTENT ASTHMA WITH EXACERBATION: ICD-10-CM

## 2019-04-15 RX ORDER — BUDESONIDE AND FORMOTEROL FUMARATE DIHYDRATE 160; 4.5 UG/1; UG/1
AEROSOL RESPIRATORY (INHALATION)
Qty: 10.2 INHALER | Refills: 0 | Status: SHIPPED | OUTPATIENT
Start: 2019-04-15 | End: 2019-05-10 | Stop reason: ALTCHOICE

## 2019-05-10 ENCOUNTER — TELEPHONE (OUTPATIENT)
Dept: FAMILY MEDICINE CLINIC | Facility: CLINIC | Age: 64
End: 2019-05-10

## 2019-05-10 RX ORDER — FLUTICASONE PROPIONATE AND SALMETEROL 250; 50 UG/1; UG/1
1 POWDER RESPIRATORY (INHALATION) EVERY 12 HOURS SCHEDULED
Qty: 1 EACH | Refills: 2 | Status: SHIPPED | OUTPATIENT
Start: 2019-05-10 | End: 2019-05-16

## 2019-05-10 NOTE — TELEPHONE ENCOUNTER
Lm for pt to Cb. Symbicort is not on pt.s formulary. He can change to Advair 250-50 mcg 1 puff twice daily if patient would like a formulary med. Dr. Regena Duverney is Camryn Chou with the change.  If ok please send back to Dr. Regena Duverney so he can update QI med list.

## 2019-05-10 NOTE — TELEPHONE ENCOUNTER
Pt called back. He is OK in taking Advair instead. He reports he has an inhaler still of the Symbicort but we can send now. I advised to not use both at same time. He agrees. Rx pended. Med list updated.

## 2019-05-16 RX ORDER — FLUTICASONE PROPIONATE AND SALMETEROL 250; 50 UG/1; UG/1
1 POWDER RESPIRATORY (INHALATION) EVERY 12 HOURS SCHEDULED
Qty: 3 EACH | Refills: 0 | Status: SHIPPED | OUTPATIENT
Start: 2019-05-16 | End: 2019-10-14

## 2019-06-12 ENCOUNTER — OFFICE VISIT (OUTPATIENT)
Dept: CARDIOLOGY | Age: 64
End: 2019-06-12

## 2019-06-12 VITALS
BODY MASS INDEX: 22.75 KG/M2 | WEIGHT: 168 LBS | SYSTOLIC BLOOD PRESSURE: 110 MMHG | DIASTOLIC BLOOD PRESSURE: 70 MMHG | HEIGHT: 72 IN | HEART RATE: 54 BPM

## 2019-06-12 DIAGNOSIS — I10 HYPERTENSION, BENIGN: ICD-10-CM

## 2019-06-12 DIAGNOSIS — I71.21 THORACIC ASCENDING AORTIC ANEURYSM (CMD): ICD-10-CM

## 2019-06-12 DIAGNOSIS — Q23.1 BICUSPID AORTIC VALVE: Primary | ICD-10-CM

## 2019-06-12 DIAGNOSIS — I47.10 PAROXYSMAL SUPRAVENTRICULAR TACHYCARDIA: ICD-10-CM

## 2019-06-12 PROCEDURE — 99215 OFFICE O/P EST HI 40 MIN: CPT | Performed by: INTERNAL MEDICINE

## 2019-06-12 PROCEDURE — 3074F SYST BP LT 130 MM HG: CPT | Performed by: INTERNAL MEDICINE

## 2019-06-12 PROCEDURE — 3078F DIAST BP <80 MM HG: CPT | Performed by: INTERNAL MEDICINE

## 2019-06-12 RX ORDER — FLUTICASONE PROPIONATE AND SALMETEROL 100; 50 UG/1; UG/1
1 POWDER RESPIRATORY (INHALATION)
COMMUNITY

## 2019-06-12 ASSESSMENT — ENCOUNTER SYMPTOMS
WEIGHT LOSS: 0
HEMOPTYSIS: 0
CHILLS: 0
FEVER: 0
WEIGHT GAIN: 0
BRUISES/BLEEDS EASILY: 0
COUGH: 0
SUSPICIOUS LESIONS: 0
ALLERGIC/IMMUNOLOGIC COMMENTS: NO NEW FOOD ALLERGIES
HEMATOCHEZIA: 0

## 2019-06-14 ENCOUNTER — MED REC SCAN ONLY (OUTPATIENT)
Dept: FAMILY MEDICINE CLINIC | Facility: CLINIC | Age: 64
End: 2019-06-14

## 2019-06-25 ENCOUNTER — HOSPITAL ENCOUNTER (OUTPATIENT)
Dept: CV DIAGNOSTICS | Facility: HOSPITAL | Age: 64
Discharge: HOME | End: 2019-06-25
Attending: INTERNAL MEDICINE
Payer: COMMERCIAL

## 2019-06-25 DIAGNOSIS — I71.2 THORACIC AORTIC ANEURYSM WITHOUT RUPTURE (HCC): ICD-10-CM

## 2019-06-25 DIAGNOSIS — Q23.1 BICUSPID AORTIC VALVE: ICD-10-CM

## 2019-06-25 PROCEDURE — 93306 TTE W/DOPPLER COMPLETE: CPT | Performed by: INTERNAL MEDICINE

## 2019-06-26 ENCOUNTER — TELEPHONE (OUTPATIENT)
Dept: CARDIOLOGY | Age: 64
End: 2019-06-26

## 2019-07-12 ENCOUNTER — HOSPITAL ENCOUNTER (OUTPATIENT)
Dept: CT IMAGING | Facility: HOSPITAL | Age: 64
Discharge: HOME | End: 2019-07-12
Attending: THORACIC SURGERY (CARDIOTHORACIC VASCULAR SURGERY)
Payer: COMMERCIAL

## 2019-07-12 DIAGNOSIS — I71.2 ASCENDING AORTIC ANEURYSM (HCC): ICD-10-CM

## 2019-07-12 PROCEDURE — 71250 CT THORAX DX C-: CPT | Performed by: THORACIC SURGERY (CARDIOTHORACIC VASCULAR SURGERY)

## 2019-09-03 ENCOUNTER — TELEPHONE (OUTPATIENT)
Dept: FAMILY MEDICINE CLINIC | Facility: CLINIC | Age: 64
End: 2019-09-03

## 2019-09-10 ENCOUNTER — OFFICE VISIT (OUTPATIENT)
Dept: FAMILY MEDICINE CLINIC | Facility: CLINIC | Age: 64
End: 2019-09-10

## 2019-09-10 VITALS
DIASTOLIC BLOOD PRESSURE: 70 MMHG | HEIGHT: 73 IN | TEMPERATURE: 97 F | BODY MASS INDEX: 22.4 KG/M2 | RESPIRATION RATE: 12 BRPM | SYSTOLIC BLOOD PRESSURE: 112 MMHG | HEART RATE: 60 BPM | WEIGHT: 169 LBS

## 2019-09-10 DIAGNOSIS — Q23.1 BICUSPID AORTIC VALVE: ICD-10-CM

## 2019-09-10 DIAGNOSIS — I77.810 DILATED AORTIC ROOT (HCC): ICD-10-CM

## 2019-09-10 DIAGNOSIS — Z13.0 SCREENING FOR ENDOCRINE, METABOLIC, AND IMMUNITY DISORDER: ICD-10-CM

## 2019-09-10 DIAGNOSIS — Z13.228 SCREENING FOR ENDOCRINE, METABOLIC, AND IMMUNITY DISORDER: ICD-10-CM

## 2019-09-10 DIAGNOSIS — Z13.220 SCREENING FOR LIPOID DISORDERS: ICD-10-CM

## 2019-09-10 DIAGNOSIS — Z13.29 SCREENING FOR ENDOCRINE, METABOLIC, AND IMMUNITY DISORDER: ICD-10-CM

## 2019-09-10 DIAGNOSIS — I10 ESSENTIAL HYPERTENSION: Primary | ICD-10-CM

## 2019-09-10 DIAGNOSIS — Z12.5 SCREENING FOR PROSTATE CANCER: ICD-10-CM

## 2019-09-10 DIAGNOSIS — Z23 NEED FOR VACCINATION: ICD-10-CM

## 2019-09-10 PROCEDURE — 99214 OFFICE O/P EST MOD 30 MIN: CPT | Performed by: FAMILY MEDICINE

## 2019-09-10 PROCEDURE — 90686 IIV4 VACC NO PRSV 0.5 ML IM: CPT | Performed by: FAMILY MEDICINE

## 2019-09-10 PROCEDURE — 90471 IMMUNIZATION ADMIN: CPT | Performed by: FAMILY MEDICINE

## 2019-09-10 RX ORDER — LOSARTAN POTASSIUM 25 MG/1
25 TABLET ORAL DAILY
Refills: 0 | COMMUNITY
Start: 2019-08-17 | End: 2020-09-24 | Stop reason: DRUGHIGH

## 2019-09-10 RX ORDER — DILTIAZEM HYDROCHLORIDE 120 MG/1
120 CAPSULE, COATED, EXTENDED RELEASE ORAL DAILY
COMMUNITY
Start: 2018-10-25

## 2019-09-10 NOTE — PROGRESS NOTES
Jd Johnson is a 58year old male. HPI:   Pt complains of nothing specific. Has hx of asthma, doing well on his Advair 250-5 1 puff twice daily. Has a history of Raynaud's phenomenon, mainly in the winter.  Patient presents for recheck of his hypert hours as needed for Wheezing or Shortness of Breath. Disp: 1 Inhaler Rfl: 2   Losartan Potassium 25 MG Oral Tab Take 25 mg by mouth daily.  Disp:  Rfl: 0      Past Medical History:   Diagnosis Date   • Asthma    • Hearing loss    • Raynaud's syndrome    • U this encounter       Imaging & Consults:  FLULAVAL INFLUENZA VACCINE QUAD PRESERVATIVE FREE 0.5 ML     See in 5-6 months

## 2019-09-11 ENCOUNTER — MED REC SCAN ONLY (OUTPATIENT)
Dept: FAMILY MEDICINE CLINIC | Facility: CLINIC | Age: 64
End: 2019-09-11

## 2019-09-23 ENCOUNTER — LAB ENCOUNTER (OUTPATIENT)
Dept: LAB | Age: 64
End: 2019-09-23
Attending: FAMILY MEDICINE
Payer: COMMERCIAL

## 2019-09-23 DIAGNOSIS — Z13.220 SCREENING FOR LIPOID DISORDERS: ICD-10-CM

## 2019-09-23 DIAGNOSIS — Z12.5 SCREENING FOR PROSTATE CANCER: ICD-10-CM

## 2019-09-23 DIAGNOSIS — Z13.228 SCREENING FOR ENDOCRINE, METABOLIC, AND IMMUNITY DISORDER: ICD-10-CM

## 2019-09-23 DIAGNOSIS — Z13.29 SCREENING FOR ENDOCRINE, METABOLIC, AND IMMUNITY DISORDER: ICD-10-CM

## 2019-09-23 DIAGNOSIS — Z13.0 SCREENING FOR ENDOCRINE, METABOLIC, AND IMMUNITY DISORDER: ICD-10-CM

## 2019-09-23 DIAGNOSIS — I10 ESSENTIAL HYPERTENSION: ICD-10-CM

## 2019-09-23 LAB
ALBUMIN SERPL-MCNC: 4.1 G/DL (ref 3.4–5)
ALBUMIN/GLOB SERPL: 1.2 {RATIO} (ref 1–2)
ALP LIVER SERPL-CCNC: 67 U/L (ref 45–117)
ALT SERPL-CCNC: 25 U/L (ref 16–61)
ANION GAP SERPL CALC-SCNC: 5 MMOL/L (ref 0–18)
AST SERPL-CCNC: 21 U/L (ref 15–37)
BASOPHILS # BLD AUTO: 0.05 X10(3) UL (ref 0–0.2)
BASOPHILS NFR BLD AUTO: 0.5 %
BILIRUB SERPL-MCNC: 1 MG/DL (ref 0.1–2)
BUN BLD-MCNC: 13 MG/DL (ref 7–18)
BUN/CREAT SERPL: 14.4 (ref 10–20)
CALCIUM BLD-MCNC: 9.1 MG/DL (ref 8.5–10.1)
CHLORIDE SERPL-SCNC: 108 MMOL/L (ref 98–112)
CHOLEST SMN-MCNC: 147 MG/DL (ref ?–200)
CO2 SERPL-SCNC: 28 MMOL/L (ref 21–32)
CREAT BLD-MCNC: 0.9 MG/DL (ref 0.7–1.3)
DEPRECATED RDW RBC AUTO: 48.8 FL (ref 35.1–46.3)
EOSINOPHIL # BLD AUTO: 0.8 X10(3) UL (ref 0–0.7)
EOSINOPHIL NFR BLD AUTO: 8.2 %
ERYTHROCYTE [DISTWIDTH] IN BLOOD BY AUTOMATED COUNT: 14 % (ref 11–15)
GLOBULIN PLAS-MCNC: 3.5 G/DL (ref 2.8–4.4)
GLUCOSE BLD-MCNC: 75 MG/DL (ref 70–99)
HCT VFR BLD AUTO: 43.9 % (ref 39–53)
HDLC SERPL-MCNC: 67 MG/DL (ref 40–59)
HGB BLD-MCNC: 14.2 G/DL (ref 13–17.5)
IMM GRANULOCYTES # BLD AUTO: 0.09 X10(3) UL (ref 0–1)
IMM GRANULOCYTES NFR BLD: 0.9 %
LDLC SERPL CALC-MCNC: 73 MG/DL (ref ?–100)
LYMPHOCYTES # BLD AUTO: 2.13 X10(3) UL (ref 1–4)
LYMPHOCYTES NFR BLD AUTO: 21.9 %
M PROTEIN MFR SERPL ELPH: 7.6 G/DL (ref 6.4–8.2)
MCH RBC QN AUTO: 30.2 PG (ref 26–34)
MCHC RBC AUTO-ENTMCNC: 32.3 G/DL (ref 31–37)
MCV RBC AUTO: 93.4 FL (ref 80–100)
MONOCYTES # BLD AUTO: 0.7 X10(3) UL (ref 0.1–1)
MONOCYTES NFR BLD AUTO: 7.2 %
NEUTROPHILS # BLD AUTO: 5.96 X10 (3) UL (ref 1.5–7.7)
NEUTROPHILS # BLD AUTO: 5.96 X10(3) UL (ref 1.5–7.7)
NEUTROPHILS NFR BLD AUTO: 61.3 %
NONHDLC SERPL-MCNC: 80 MG/DL (ref ?–130)
OSMOLALITY SERPL CALC.SUM OF ELEC: 291 MOSM/KG (ref 275–295)
PLATELET # BLD AUTO: 279 10(3)UL (ref 150–450)
POTASSIUM SERPL-SCNC: 4.1 MMOL/L (ref 3.5–5.1)
PSA SERPL-MCNC: 3.72 NG/ML (ref ?–4)
RBC # BLD AUTO: 4.7 X10(6)UL (ref 4.3–5.7)
SODIUM SERPL-SCNC: 141 MMOL/L (ref 136–145)
TRIGL SERPL-MCNC: 37 MG/DL (ref 30–149)
VLDLC SERPL CALC-MCNC: 7 MG/DL (ref 0–30)
WBC # BLD AUTO: 9.7 X10(3) UL (ref 4–11)

## 2019-09-23 PROCEDURE — 80061 LIPID PANEL: CPT

## 2019-09-23 PROCEDURE — 36415 COLL VENOUS BLD VENIPUNCTURE: CPT

## 2019-09-23 PROCEDURE — 80053 COMPREHEN METABOLIC PANEL: CPT

## 2019-09-23 PROCEDURE — 84153 ASSAY OF PSA TOTAL: CPT

## 2019-09-23 PROCEDURE — 85025 COMPLETE CBC W/AUTO DIFF WBC: CPT

## 2019-10-13 DIAGNOSIS — J45.901 ASTHMA FLARE: ICD-10-CM

## 2019-10-13 DIAGNOSIS — J45.41 MODERATE PERSISTENT ASTHMA WITH EXACERBATION: ICD-10-CM

## 2019-10-14 RX ORDER — MOMETASONE FUROATE 220 UG/1
INHALANT RESPIRATORY (INHALATION)
Qty: 1 INHALER | Refills: 0 | OUTPATIENT
Start: 2019-10-14

## 2019-10-14 RX ORDER — FLUTICASONE PROPIONATE AND SALMETEROL 50; 250 UG/1; UG/1
POWDER RESPIRATORY (INHALATION)
Qty: 60 EACH | Refills: 0 | Status: SHIPPED | OUTPATIENT
Start: 2019-10-14 | End: 2019-12-27

## 2019-10-14 RX ORDER — BUDESONIDE AND FORMOTEROL FUMARATE DIHYDRATE 160; 4.5 UG/1; UG/1
AEROSOL RESPIRATORY (INHALATION)
Refills: 0 | OUTPATIENT
Start: 2019-10-14

## 2019-10-28 ENCOUNTER — OFFICE VISIT (OUTPATIENT)
Dept: FAMILY MEDICINE CLINIC | Facility: CLINIC | Age: 64
End: 2019-10-28

## 2019-10-28 VITALS
WEIGHT: 167 LBS | RESPIRATION RATE: 20 BRPM | OXYGEN SATURATION: 98 % | DIASTOLIC BLOOD PRESSURE: 68 MMHG | SYSTOLIC BLOOD PRESSURE: 104 MMHG | BODY MASS INDEX: 22.13 KG/M2 | HEART RATE: 60 BPM | HEIGHT: 73 IN

## 2019-10-28 DIAGNOSIS — N40.0 BPH WITHOUT URINARY OBSTRUCTION: Primary | ICD-10-CM

## 2019-10-28 PROCEDURE — 99213 OFFICE O/P EST LOW 20 MIN: CPT | Performed by: FAMILY MEDICINE

## 2019-10-28 NOTE — PROGRESS NOTES
Prostate exam    Patient is here to have a prostate exam done. His PSA has risen from 3.35-3.72. He is asymptomatic.     /68   Pulse 60   Resp 20   Ht 73\"   Wt 167 lb (75.8 kg)   SpO2 98%   BMI 22.03 kg/m²     Rectal: Prostate enlarged without nodu

## 2019-11-08 RX ORDER — DILTIAZEM HYDROCHLORIDE 120 MG/1
CAPSULE, COATED, EXTENDED RELEASE ORAL
Qty: 30 CAPSULE | Refills: 6 | Status: SHIPPED | OUTPATIENT
Start: 2019-11-08 | End: 2020-05-29 | Stop reason: SDUPTHER

## 2019-11-09 DIAGNOSIS — J45.901 ASTHMA FLARE: ICD-10-CM

## 2019-11-11 RX ORDER — FLUTICASONE PROPIONATE AND SALMETEROL 50; 250 UG/1; UG/1
POWDER RESPIRATORY (INHALATION)
Refills: 0 | OUTPATIENT
Start: 2019-11-11

## 2019-11-11 RX ORDER — MOMETASONE FUROATE 220 UG/1
INHALANT RESPIRATORY (INHALATION)
Refills: 0 | OUTPATIENT
Start: 2019-11-11

## 2019-11-11 RX ORDER — LOSARTAN POTASSIUM 25 MG/1
TABLET ORAL
Qty: 90 TABLET | Refills: 0 | Status: SHIPPED | OUTPATIENT
Start: 2019-11-11 | End: 2020-01-13 | Stop reason: SDUPTHER

## 2019-12-18 ENCOUNTER — APPOINTMENT (OUTPATIENT)
Dept: CARDIOLOGY | Age: 64
End: 2019-12-18

## 2019-12-27 RX ORDER — FLUTICASONE PROPIONATE AND SALMETEROL 50; 250 UG/1; UG/1
POWDER RESPIRATORY (INHALATION)
Qty: 60 EACH | Refills: 2 | Status: SHIPPED | OUTPATIENT
Start: 2019-12-27 | End: 2020-03-25

## 2019-12-27 NOTE — TELEPHONE ENCOUNTER
Protocol failed d/t      Asthma & COPD Medication Protocol [id number]/27 3:42 AM   Asthma Action Score greater than or equal to 20    AAP/ACT given in last 12 months     Pt has appt 3/10. Please approve if appropriate. Thanks.

## 2020-01-13 ENCOUNTER — APPOINTMENT (OUTPATIENT)
Dept: CARDIOLOGY | Age: 65
End: 2020-01-13

## 2020-01-13 ENCOUNTER — OFFICE VISIT (OUTPATIENT)
Dept: CARDIOLOGY | Age: 65
End: 2020-01-13

## 2020-01-13 VITALS
DIASTOLIC BLOOD PRESSURE: 80 MMHG | BODY MASS INDEX: 22.75 KG/M2 | HEART RATE: 62 BPM | HEIGHT: 72 IN | SYSTOLIC BLOOD PRESSURE: 132 MMHG | WEIGHT: 168 LBS

## 2020-01-13 DIAGNOSIS — Q23.1 BICUSPID AORTIC VALVE: ICD-10-CM

## 2020-01-13 DIAGNOSIS — I71.20 THORACIC AORTIC ANEURYSM WITHOUT RUPTURE (CMD): Primary | ICD-10-CM

## 2020-01-13 PROCEDURE — 99214 OFFICE O/P EST MOD 30 MIN: CPT | Performed by: NURSE PRACTITIONER

## 2020-01-13 PROCEDURE — 3075F SYST BP GE 130 - 139MM HG: CPT | Performed by: NURSE PRACTITIONER

## 2020-01-13 PROCEDURE — 3079F DIAST BP 80-89 MM HG: CPT | Performed by: NURSE PRACTITIONER

## 2020-01-13 RX ORDER — LOSARTAN POTASSIUM 50 MG/1
50 TABLET ORAL DAILY
Qty: 30 TABLET | Refills: 11 | Status: SHIPPED | OUTPATIENT
Start: 2020-01-13 | End: 2020-02-11 | Stop reason: SDUPTHER

## 2020-01-13 SDOH — HEALTH STABILITY: MENTAL HEALTH: HOW OFTEN DO YOU HAVE A DRINK CONTAINING ALCOHOL?: NEVER

## 2020-01-13 SDOH — HEALTH STABILITY: PHYSICAL HEALTH: ON AVERAGE, HOW MANY MINUTES DO YOU ENGAGE IN EXERCISE AT THIS LEVEL?: 0 MIN

## 2020-01-13 SDOH — SOCIAL STABILITY: SOCIAL NETWORK: ARE YOU MARRIED, WIDOWED, DIVORCED, SEPARATED, NEVER MARRIED, OR LIVING WITH A PARTNER?: MARRIED

## 2020-01-13 SDOH — HEALTH STABILITY: PHYSICAL HEALTH: ON AVERAGE, HOW MANY DAYS PER WEEK DO YOU ENGAGE IN MODERATE TO STRENUOUS EXERCISE (LIKE A BRISK WALK)?: 0 DAYS

## 2020-01-13 ASSESSMENT — ENCOUNTER SYMPTOMS
RESPIRATORY NEGATIVE: 1
BLOATING: 0
ORTHOPNEA: 0
GASTROINTESTINAL NEGATIVE: 1
NEAR-SYNCOPE: 0
SYNCOPE: 0
CONSTITUTIONAL NEGATIVE: 1
EYES NEGATIVE: 1
COUGH: 0
NEUROLOGICAL NEGATIVE: 1
ENDOCRINE NEGATIVE: 1
PSYCHIATRIC NEGATIVE: 1
SHORTNESS OF BREATH: 0

## 2020-01-13 ASSESSMENT — PATIENT HEALTH QUESTIONNAIRE - PHQ9
SUM OF ALL RESPONSES TO PHQ9 QUESTIONS 1 AND 2: 0
SUM OF ALL RESPONSES TO PHQ9 QUESTIONS 1 AND 2: 0
2. FEELING DOWN, DEPRESSED OR HOPELESS: NOT AT ALL
1. LITTLE INTEREST OR PLEASURE IN DOING THINGS: NOT AT ALL

## 2020-02-13 RX ORDER — LOSARTAN POTASSIUM 50 MG/1
50 TABLET ORAL DAILY
Qty: 30 TABLET | Refills: 9 | Status: SHIPPED | OUTPATIENT
Start: 2020-02-13 | End: 2021-06-03

## 2020-03-25 RX ORDER — FLUTICASONE PROPIONATE AND SALMETEROL 250; 50 UG/1; UG/1
POWDER RESPIRATORY (INHALATION)
Qty: 60 EACH | Refills: 2 | Status: SHIPPED | OUTPATIENT
Start: 2020-03-25 | End: 2020-06-19

## 2020-05-07 ENCOUNTER — TELEPHONE (OUTPATIENT)
Dept: FAMILY MEDICINE CLINIC | Facility: CLINIC | Age: 65
End: 2020-05-07

## 2020-05-08 NOTE — TELEPHONE ENCOUNTER
Please call patient to schedule video or telephone (if Medicare will need video) medication follow up visit with Dr. Donna Diego. Have him check BPs for a few days prior to the appointment if possible.

## 2020-05-26 NOTE — TELEPHONE ENCOUNTER
Left voicemail for patient to call back to schedule this appointment. Sent unable to reach letter via [employer].

## 2020-05-29 RX ORDER — DILTIAZEM HYDROCHLORIDE 120 MG/1
120 CAPSULE, COATED, EXTENDED RELEASE ORAL DAILY
Qty: 90 CAPSULE | Refills: 0 | Status: SHIPPED | OUTPATIENT
Start: 2020-05-29 | End: 2020-08-24

## 2020-06-19 ENCOUNTER — HOSPITAL ENCOUNTER (OUTPATIENT)
Dept: CT IMAGING | Facility: HOSPITAL | Age: 65
Discharge: HOME | End: 2020-06-19
Attending: NURSE PRACTITIONER
Payer: COMMERCIAL

## 2020-06-19 VITALS — RESPIRATION RATE: 15 BRPM | SYSTOLIC BLOOD PRESSURE: 131 MMHG | HEART RATE: 72 BPM | DIASTOLIC BLOOD PRESSURE: 79 MMHG

## 2020-06-19 DIAGNOSIS — Q23.1 BICUSPID AORTIC VALVE: ICD-10-CM

## 2020-06-19 DIAGNOSIS — I71.2 THORACIC AORTIC ANEURYSM WITHOUT RUPTURE (HCC): ICD-10-CM

## 2020-06-19 PROCEDURE — 71275 CT ANGIOGRAPHY CHEST: CPT | Performed by: INTERNAL MEDICINE

## 2020-06-19 PROCEDURE — 71275 CT ANGIOGRAPHY CHEST: CPT | Performed by: NURSE PRACTITIONER

## 2020-06-19 PROCEDURE — 82565 ASSAY OF CREATININE: CPT

## 2020-06-19 RX ORDER — FLUTICASONE PROPIONATE AND SALMETEROL 250; 50 UG/1; UG/1
POWDER RESPIRATORY (INHALATION)
Qty: 60 EACH | Refills: 1 | Status: SHIPPED | OUTPATIENT
Start: 2020-06-19 | End: 2020-10-09

## 2020-06-19 NOTE — IMAGING NOTE
Explained CTA procedure to patient. Patient asymptomatic, denies CP distress. Patient tolerated procedure well. Average heart rate =57    . Denies allergic reaction to IV contrast dye. Saline lock discontinued with pressure,, 2 x 2 gauze and Coban wrap.

## 2020-06-24 ENCOUNTER — TELEPHONE (OUTPATIENT)
Dept: CARDIOLOGY | Age: 65
End: 2020-06-24

## 2020-07-06 ENCOUNTER — HOSPITAL ENCOUNTER (OUTPATIENT)
Dept: CV DIAGNOSTICS | Facility: HOSPITAL | Age: 65
Discharge: HOME | End: 2020-07-06
Attending: NURSE PRACTITIONER
Payer: COMMERCIAL

## 2020-07-06 DIAGNOSIS — I71.2 THORACIC ASCENDING AORTIC ANEURYSM (HCC): ICD-10-CM

## 2020-07-06 DIAGNOSIS — Q23.1 BICUSPID AORTIC VALVE: ICD-10-CM

## 2020-07-06 PROCEDURE — 93306 TTE W/DOPPLER COMPLETE: CPT | Performed by: INTERNAL MEDICINE

## 2020-07-07 ENCOUNTER — TELEPHONE (OUTPATIENT)
Dept: CARDIOLOGY | Age: 65
End: 2020-07-07

## 2020-07-15 ENCOUNTER — V-VISIT (OUTPATIENT)
Dept: CARDIOLOGY | Age: 65
End: 2020-07-15

## 2020-07-15 ENCOUNTER — APPOINTMENT (OUTPATIENT)
Dept: CARDIOLOGY | Age: 65
End: 2020-07-15

## 2020-07-15 VITALS
HEIGHT: 72 IN | DIASTOLIC BLOOD PRESSURE: 77 MMHG | HEART RATE: 58 BPM | BODY MASS INDEX: 22.89 KG/M2 | SYSTOLIC BLOOD PRESSURE: 122 MMHG | WEIGHT: 169 LBS

## 2020-07-15 DIAGNOSIS — I47.10 PAROXYSMAL SUPRAVENTRICULAR TACHYCARDIA: ICD-10-CM

## 2020-07-15 DIAGNOSIS — I71.21 THORACIC ASCENDING AORTIC ANEURYSM (CMD): ICD-10-CM

## 2020-07-15 DIAGNOSIS — Q23.1 BICUSPID AORTIC VALVE: Primary | ICD-10-CM

## 2020-07-15 DIAGNOSIS — I10 HYPERTENSION, BENIGN: ICD-10-CM

## 2020-07-15 PROCEDURE — 99442 TELEPHONE E&M BY PHYSICIAN EST PT NOT ORIG PREV 7 DAYS 11-20 MIN: CPT | Performed by: INTERNAL MEDICINE

## 2020-07-15 ASSESSMENT — ENCOUNTER SYMPTOMS
WEIGHT LOSS: 0
HEMOPTYSIS: 0
HEMATOCHEZIA: 0
ALLERGIC/IMMUNOLOGIC COMMENTS: NO NEW FOOD ALLERGIES
COUGH: 0
WEIGHT GAIN: 0
CHILLS: 0
BRUISES/BLEEDS EASILY: 0
SUSPICIOUS LESIONS: 0
FEVER: 0

## 2020-07-15 ASSESSMENT — PATIENT HEALTH QUESTIONNAIRE - PHQ9
SUM OF ALL RESPONSES TO PHQ9 QUESTIONS 1 AND 2: 0
CLINICAL INTERPRETATION OF PHQ2 SCORE: NO FURTHER SCREENING NEEDED
CLINICAL INTERPRETATION OF PHQ9 SCORE: NO FURTHER SCREENING NEEDED
2. FEELING DOWN, DEPRESSED OR HOPELESS: NOT AT ALL
1. LITTLE INTEREST OR PLEASURE IN DOING THINGS: NOT AT ALL
SUM OF ALL RESPONSES TO PHQ9 QUESTIONS 1 AND 2: 0

## 2020-08-05 NOTE — TELEPHONE ENCOUNTER
LVM for pt to call and schedule appt. [FreeTextEntry1] : Mr. Singleton is an 89 year-old man with a known history of atrial fibrillation, hypertension, asthma and hyperlipidemia who presents today for  evaluation. He has been doing well without any issues. He has no chest pain or sob.  No ELIZA, PND or orthopnea.  He has no palpitations.  No dizziness.  Walks 1/4 mile to the pharmacy. He has bradycardia.  no syncope

## 2020-08-24 RX ORDER — DILTIAZEM HYDROCHLORIDE 120 MG/1
120 CAPSULE, COATED, EXTENDED RELEASE ORAL DAILY
Qty: 90 CAPSULE | Refills: 3 | Status: SHIPPED | OUTPATIENT
Start: 2020-08-24

## 2020-09-24 ENCOUNTER — LAB ENCOUNTER (OUTPATIENT)
Dept: LAB | Age: 65
End: 2020-09-24
Attending: FAMILY MEDICINE
Payer: COMMERCIAL

## 2020-09-24 ENCOUNTER — OFFICE VISIT (OUTPATIENT)
Dept: FAMILY MEDICINE CLINIC | Facility: CLINIC | Age: 65
End: 2020-09-24

## 2020-09-24 VITALS
HEART RATE: 64 BPM | WEIGHT: 167 LBS | RESPIRATION RATE: 16 BRPM | DIASTOLIC BLOOD PRESSURE: 72 MMHG | TEMPERATURE: 99 F | HEIGHT: 72 IN | SYSTOLIC BLOOD PRESSURE: 112 MMHG | BODY MASS INDEX: 22.62 KG/M2

## 2020-09-24 DIAGNOSIS — I10 ESSENTIAL HYPERTENSION: ICD-10-CM

## 2020-09-24 DIAGNOSIS — N40.0 BPH WITHOUT URINARY OBSTRUCTION: ICD-10-CM

## 2020-09-24 DIAGNOSIS — Z13.220 SCREENING FOR LIPOID DISORDERS: ICD-10-CM

## 2020-09-24 DIAGNOSIS — Z13.29 SCREENING FOR ENDOCRINE, METABOLIC, AND IMMUNITY DISORDER: ICD-10-CM

## 2020-09-24 DIAGNOSIS — Q23.1 BICUSPID AORTIC VALVE: ICD-10-CM

## 2020-09-24 DIAGNOSIS — Z13.89 SCREENING FOR GENITOURINARY CONDITION: ICD-10-CM

## 2020-09-24 DIAGNOSIS — Z13.228 SCREENING FOR ENDOCRINE, METABOLIC, AND IMMUNITY DISORDER: ICD-10-CM

## 2020-09-24 DIAGNOSIS — Z23 NEED FOR VACCINATION: ICD-10-CM

## 2020-09-24 DIAGNOSIS — Z00.00 ANNUAL PHYSICAL EXAM: Primary | ICD-10-CM

## 2020-09-24 DIAGNOSIS — Z12.5 SCREENING FOR PROSTATE CANCER: ICD-10-CM

## 2020-09-24 DIAGNOSIS — Z13.0 SCREENING FOR ENDOCRINE, METABOLIC, AND IMMUNITY DISORDER: ICD-10-CM

## 2020-09-24 DIAGNOSIS — J45.41 MODERATE PERSISTENT ASTHMA WITH EXACERBATION: ICD-10-CM

## 2020-09-24 DIAGNOSIS — I77.810 DILATED AORTIC ROOT (HCC): ICD-10-CM

## 2020-09-24 LAB
ALBUMIN SERPL-MCNC: 4.2 G/DL (ref 3.4–5)
ALBUMIN/GLOB SERPL: 1.2 {RATIO} (ref 1–2)
ALP LIVER SERPL-CCNC: 63 U/L
ALT SERPL-CCNC: 28 U/L
ANION GAP SERPL CALC-SCNC: 4 MMOL/L (ref 0–18)
AST SERPL-CCNC: 24 U/L (ref 15–37)
BASOPHILS # BLD AUTO: 0.08 X10(3) UL (ref 0–0.2)
BASOPHILS NFR BLD AUTO: 0.8 %
BILIRUB SERPL-MCNC: 0.7 MG/DL (ref 0.1–2)
BILIRUB UR QL STRIP.AUTO: NEGATIVE
BUN BLD-MCNC: 13 MG/DL (ref 7–18)
BUN/CREAT SERPL: 13.5 (ref 10–20)
CALCIUM BLD-MCNC: 9.6 MG/DL (ref 8.5–10.1)
CHLORIDE SERPL-SCNC: 107 MMOL/L (ref 98–112)
CHOLEST SMN-MCNC: 146 MG/DL (ref ?–200)
CLARITY UR REFRACT.AUTO: CLEAR
CO2 SERPL-SCNC: 27 MMOL/L (ref 21–32)
COLOR UR AUTO: YELLOW
CREAT BLD-MCNC: 0.96 MG/DL
DEPRECATED RDW RBC AUTO: 47 FL (ref 35.1–46.3)
EOSINOPHIL # BLD AUTO: 0.44 X10(3) UL (ref 0–0.7)
EOSINOPHIL NFR BLD AUTO: 4.5 %
ERYTHROCYTE [DISTWIDTH] IN BLOOD BY AUTOMATED COUNT: 13.7 % (ref 11–15)
GLOBULIN PLAS-MCNC: 3.5 G/DL (ref 2.8–4.4)
GLUCOSE BLD-MCNC: 76 MG/DL (ref 70–99)
GLUCOSE UR STRIP.AUTO-MCNC: NEGATIVE MG/DL
HCT VFR BLD AUTO: 44.3 %
HDLC SERPL-MCNC: 79 MG/DL (ref 40–59)
HGB BLD-MCNC: 14.5 G/DL
IMM GRANULOCYTES # BLD AUTO: 0.03 X10(3) UL (ref 0–1)
IMM GRANULOCYTES NFR BLD: 0.3 %
KETONES UR STRIP.AUTO-MCNC: NEGATIVE MG/DL
LDLC SERPL CALC-MCNC: 60 MG/DL (ref ?–100)
LEUKOCYTE ESTERASE UR QL STRIP.AUTO: NEGATIVE
LYMPHOCYTES # BLD AUTO: 2.31 X10(3) UL (ref 1–4)
LYMPHOCYTES NFR BLD AUTO: 23.8 %
M PROTEIN MFR SERPL ELPH: 7.7 G/DL (ref 6.4–8.2)
MCH RBC QN AUTO: 30.6 PG (ref 26–34)
MCHC RBC AUTO-ENTMCNC: 32.7 G/DL (ref 31–37)
MCV RBC AUTO: 93.5 FL
MONOCYTES # BLD AUTO: 0.72 X10(3) UL (ref 0.1–1)
MONOCYTES NFR BLD AUTO: 7.4 %
NEUTROPHILS # BLD AUTO: 6.11 X10 (3) UL (ref 1.5–7.7)
NEUTROPHILS # BLD AUTO: 6.11 X10(3) UL (ref 1.5–7.7)
NEUTROPHILS NFR BLD AUTO: 63.2 %
NITRITE UR QL STRIP.AUTO: NEGATIVE
NONHDLC SERPL-MCNC: 67 MG/DL (ref ?–130)
OSMOLALITY SERPL CALC.SUM OF ELEC: 285 MOSM/KG (ref 275–295)
PATIENT FASTING Y/N/NP: YES
PATIENT FASTING Y/N/NP: YES
PH UR STRIP.AUTO: 6 [PH] (ref 4.5–8)
PLATELET # BLD AUTO: 184 10(3)UL (ref 150–450)
POTASSIUM SERPL-SCNC: 4.3 MMOL/L (ref 3.5–5.1)
PROT UR STRIP.AUTO-MCNC: NEGATIVE MG/DL
PSA SERPL-MCNC: 3.6 NG/ML (ref ?–4)
RBC # BLD AUTO: 4.74 X10(6)UL
RBC UR QL AUTO: NEGATIVE
SODIUM SERPL-SCNC: 138 MMOL/L (ref 136–145)
SP GR UR STRIP.AUTO: 1.02 (ref 1–1.03)
TRIGL SERPL-MCNC: 35 MG/DL (ref 30–149)
UROBILINOGEN UR STRIP.AUTO-MCNC: <2 MG/DL
VLDLC SERPL CALC-MCNC: 7 MG/DL (ref 0–30)
WBC # BLD AUTO: 9.7 X10(3) UL (ref 4–11)

## 2020-09-24 PROCEDURE — 99397 PER PM REEVAL EST PAT 65+ YR: CPT | Performed by: FAMILY MEDICINE

## 2020-09-24 PROCEDURE — 81003 URINALYSIS AUTO W/O SCOPE: CPT

## 2020-09-24 PROCEDURE — 80061 LIPID PANEL: CPT

## 2020-09-24 PROCEDURE — 36415 COLL VENOUS BLD VENIPUNCTURE: CPT

## 2020-09-24 PROCEDURE — 85025 COMPLETE CBC W/AUTO DIFF WBC: CPT

## 2020-09-24 PROCEDURE — 3008F BODY MASS INDEX DOCD: CPT | Performed by: FAMILY MEDICINE

## 2020-09-24 PROCEDURE — 90471 IMMUNIZATION ADMIN: CPT | Performed by: FAMILY MEDICINE

## 2020-09-24 PROCEDURE — 3074F SYST BP LT 130 MM HG: CPT | Performed by: FAMILY MEDICINE

## 2020-09-24 PROCEDURE — 90472 IMMUNIZATION ADMIN EACH ADD: CPT | Performed by: FAMILY MEDICINE

## 2020-09-24 PROCEDURE — 80053 COMPREHEN METABOLIC PANEL: CPT

## 2020-09-24 PROCEDURE — 90670 PCV13 VACCINE IM: CPT | Performed by: FAMILY MEDICINE

## 2020-09-24 PROCEDURE — 3078F DIAST BP <80 MM HG: CPT | Performed by: FAMILY MEDICINE

## 2020-09-24 PROCEDURE — 84153 ASSAY OF PSA TOTAL: CPT

## 2020-09-24 PROCEDURE — 90662 IIV NO PRSV INCREASED AG IM: CPT | Performed by: FAMILY MEDICINE

## 2020-09-24 RX ORDER — LOSARTAN POTASSIUM 50 MG/1
50 TABLET ORAL DAILY
COMMUNITY
Start: 2020-07-13

## 2020-09-24 NOTE — PROGRESS NOTES
Cherelle Ragland is a 71year old male who presents for a complete physical exam.   HPI:   Pt complains of nothing specific. Has hx of asthma, doing well on his Symbicort 160-4.5 MCG/ACT 2 puffs twice daily.  Has a history of Raynaud's phenomenon, mainly in fluticasone-salmeterol (ADVAIR DISKUS) 250-50 MCG/DOSE Inhalation Aerosol Powder, Breath Activated INHALE 1 PUFF INTO THE LUNGS EVERY 12 HOURS 60 each 1   • dilTIAZem HCl ER Coated Beads 120 MG Oral Capsule SR 24 Hr Take 120 mg by mouth daily.      • [place] °F (37.1 °C)   Resp 16   Ht 72\"   Wt 167 lb (75.8 kg)   BMI 22.65 kg/m²   Body mass index is 22.65 kg/m².    GENERAL: well developed, well nourished,in no apparent distress  SKIN: Raised pruritic macules noted over the chest and arms  HEENT: atraumatic, no

## 2020-09-29 ENCOUNTER — TELEPHONE (OUTPATIENT)
Dept: CARDIOLOGY | Age: 65
End: 2020-09-29

## 2020-10-09 DIAGNOSIS — J45.30 MILD PERSISTENT ASTHMA WITHOUT COMPLICATION: Primary | ICD-10-CM

## 2020-10-09 RX ORDER — FLUTICASONE PROPIONATE AND SALMETEROL 250; 50 UG/1; UG/1
POWDER RESPIRATORY (INHALATION)
Qty: 60 EACH | Refills: 1 | Status: SHIPPED | OUTPATIENT
Start: 2020-10-09 | End: 2020-12-08

## 2020-12-08 DIAGNOSIS — J45.30 MILD PERSISTENT ASTHMA WITHOUT COMPLICATION: ICD-10-CM

## 2020-12-08 RX ORDER — FLUTICASONE PROPIONATE AND SALMETEROL 250; 50 UG/1; UG/1
POWDER RESPIRATORY (INHALATION)
Qty: 60 EACH | Refills: 1 | Status: SHIPPED | OUTPATIENT
Start: 2020-12-08 | End: 2021-02-02

## 2021-01-06 ENCOUNTER — PATIENT MESSAGE (OUTPATIENT)
Dept: FAMILY MEDICINE CLINIC | Facility: CLINIC | Age: 66
End: 2021-01-06

## 2021-01-07 NOTE — TELEPHONE ENCOUNTER
Unfortunately taking half the dose could result in only half the effectiveness.   I would suggest the patient call his pharmacy  to see what is covered at a cheaper price

## 2021-01-07 NOTE — TELEPHONE ENCOUNTER
Bella Granda, [place] 1/6/2021 5:46 PM CST      ----- Message -----  From: Chela Edgar  Sent: 1/6/2021 4:56 PM CST  To: [facility] Clinical Staff  Subject: Prescription Question     I am currently using [address] for my asthma.  Is there a cheaper al

## 2021-01-24 ENCOUNTER — HOSPITAL ENCOUNTER (OUTPATIENT)
Age: 66
Discharge: HOME | End: 2021-01-24
Attending: EMERGENCY MEDICINE
Payer: MEDICARE

## 2021-01-24 ENCOUNTER — APPOINTMENT (OUTPATIENT)
Dept: CT IMAGING | Age: 66
End: 2021-01-24
Attending: EMERGENCY MEDICINE
Payer: MEDICARE

## 2021-01-24 VITALS
HEART RATE: 52 BPM | TEMPERATURE: 99 F | SYSTOLIC BLOOD PRESSURE: 152 MMHG | RESPIRATION RATE: 16 BRPM | DIASTOLIC BLOOD PRESSURE: 91 MMHG | OXYGEN SATURATION: 100 %

## 2021-01-24 DIAGNOSIS — N20.0 KIDNEY STONE: Primary | ICD-10-CM

## 2021-01-24 LAB
#MXD IC: 0.9 X10ˆ3/UL (ref 0.1–1)
CREAT BLD-MCNC: 0.9 MG/DL
GLUCOSE BLD-MCNC: 107 MG/DL (ref 70–99)
HCT VFR BLD AUTO: 45 %
HGB BLD-MCNC: 14.5 G/DL
ISTAT BUN: 15 MG/DL (ref 7–18)
ISTAT CHLORIDE: 104 MMOL/L (ref 98–112)
ISTAT HEMATOCRIT: 47 %
ISTAT IONIZED CALCIUM FOR CHEM 8: 1.21 MMOL/L (ref 1.12–1.32)
ISTAT POTASSIUM: 4.2 MMOL/L (ref 3.6–5.1)
ISTAT SODIUM: 138 MMOL/L (ref 136–145)
ISTAT TCO2: 26 MMOL/L (ref 21–32)
LYMPHOCYTES # BLD AUTO: 1.8 X10ˆ3/UL (ref 1–4)
LYMPHOCYTES NFR BLD AUTO: 15 %
MCH RBC QN AUTO: 30 PG (ref 26–34)
MCHC RBC AUTO-ENTMCNC: 32.2 G/DL (ref 31–37)
MCV RBC AUTO: 93.2 FL (ref 80–100)
MIXED CELL %: 7.2 %
NEUTROPHILS # BLD AUTO: 9.3 X10ˆ3/UL (ref 1.5–7.7)
NEUTROPHILS NFR BLD AUTO: 77.8 %
PLATELET # BLD AUTO: 276 X10ˆ3/UL (ref 150–450)
POCT BILIRUBIN URINE: NEGATIVE
POCT GLUCOSE URINE: NEGATIVE MG/DL
POCT KETONE URINE: NEGATIVE MG/DL
POCT LEUKOCYTE ESTERASE URINE: NEGATIVE
POCT NITRITE URINE: NEGATIVE
POCT PH URINE: 6 (ref 5–8)
POCT PROTEIN URINE: NEGATIVE MG/DL
POCT SPECIFIC GRAVITY URINE: 1.02
POCT URINE CLARITY: CLEAR
POCT URINE COLOR: YELLOW
POCT UROBILINOGEN URINE: 0.2 MG/DL
RBC # BLD AUTO: 4.83 X10ˆ6/UL
WBC # BLD AUTO: 12 X10ˆ3/UL (ref 4–11)

## 2021-01-24 PROCEDURE — 96374 THER/PROPH/DIAG INJ IV PUSH: CPT

## 2021-01-24 PROCEDURE — 99214 OFFICE O/P EST MOD 30 MIN: CPT

## 2021-01-24 PROCEDURE — 74177 CT ABD & PELVIS W/CONTRAST: CPT | Performed by: EMERGENCY MEDICINE

## 2021-01-24 PROCEDURE — 85025 COMPLETE CBC W/AUTO DIFF WBC: CPT | Performed by: EMERGENCY MEDICINE

## 2021-01-24 PROCEDURE — 80047 BASIC METABLC PNL IONIZED CA: CPT

## 2021-01-24 PROCEDURE — 81002 URINALYSIS NONAUTO W/O SCOPE: CPT | Performed by: EMERGENCY MEDICINE

## 2021-01-24 RX ORDER — KETOROLAC TROMETHAMINE 10 MG/1
10 TABLET, FILM COATED ORAL EVERY 6 HOURS PRN
Qty: 20 TABLET | Refills: 0 | Status: SHIPPED | OUTPATIENT
Start: 2021-01-24 | End: 2021-01-29

## 2021-01-24 RX ORDER — TAMSULOSIN HYDROCHLORIDE 0.4 MG/1
0.4 CAPSULE ORAL DAILY
Qty: 14 CAPSULE | Refills: 0 | Status: SHIPPED | OUTPATIENT
Start: 2021-01-24 | End: 2021-02-01

## 2021-01-24 RX ORDER — KETOROLAC TROMETHAMINE 30 MG/ML
15 INJECTION, SOLUTION INTRAMUSCULAR; INTRAVENOUS ONCE
Status: COMPLETED | OUTPATIENT
Start: 2021-01-24 | End: 2021-01-24

## 2021-01-24 NOTE — ED INITIAL ASSESSMENT (HPI)
LLQ abdominal pain - on and off x 2 days. Took miralax this morning. Today pain worse and constant for the past 2 hours. Pt states he had no BM since Friday.   He states he does not feel its constipation

## 2021-01-24 NOTE — ED PROVIDER NOTES
Patient Seen in: [facility] [place]      History   Patient presents with:  Abdominal Pain    Stated Complaint: abd pain     HPI/Subjective:   HPI    62-year-old male history of asthma, ascending aortic aneurysm here with left lower quadrant pain quadrant. Skin:     General: Skin is warm and dry. Neurological:      General: No focal deficit present. Mental Status: He is alert and oriented to person, place, and time. Mental status is at baseline.       Comments: moves all extremities against ductal dilatation. PANCREAS:  Homogeneous enhancement. SPLEEN:  Normal caliber. KIDNEYS:  There is a tiny 1 mm calculus in the distal left ureter, image 89. There is mild left hydronephrosis.   Right kidney is negative for nephrolithiasis or hydronephrosis PM    START taking these medications    tamsulosin (FLOMAX) cap  Take 1 capsule (0.4 mg total) by mouth daily for 14 days. , Normal, Disp-14 capsule, R-0    Ketorolac Tromethamine 10 MG Oral Tab  Take 1 tablet (10 mg total) by mouth every 6 (six) hours as n

## 2021-01-25 ENCOUNTER — TELEPHONE (OUTPATIENT)
Dept: FAMILY MEDICINE CLINIC | Facility: CLINIC | Age: 66
End: 2021-01-25

## 2021-01-25 DIAGNOSIS — N20.0 KIDNEY STONE: Primary | ICD-10-CM

## 2021-01-25 NOTE — TELEPHONE ENCOUNTER
1. What are your symptoms? Abdominal pain 6 out of 10    2. How long have you been having these symptoms? 3. Have you done anything already to treat your symptoms?          ADDITIONAL INFO:   Patient was seen yesterday and was given medication for

## 2021-01-25 NOTE — TELEPHONE ENCOUNTER
Pt called. He was in the IC yesterday. He has a 1 MM left  kidney stone/ he felt fine this am and now he is having pain. He was given tamsulosin, ketorolac Tromethamine 10 mg as needed for pain and a strainer.  Pt has not taken any more pain medication toda

## 2021-01-28 ENCOUNTER — TELEPHONE (OUTPATIENT)
Dept: FAMILY MEDICINE CLINIC | Facility: CLINIC | Age: 66
End: 2021-01-28

## 2021-01-28 DIAGNOSIS — Z20.822 EXPOSURE TO COVID-19 VIRUS: Primary | ICD-10-CM

## 2021-01-28 NOTE — TELEPHONE ENCOUNTER
Pt. Was with his daughter on 01/24/2021. She started with a ST 2 days later. She tested + for COVID yesterday. He is currently asymptomatic. Can he get tested ? He also wants to know if he has to stay home from work? Please advise.

## 2021-01-28 NOTE — TELEPHONE ENCOUNTER
What symptoms is the patient experiencing?:    No symptoms. Has the patient had ANY travel within the last 30 days (domestic or international - please list) - IF YES, SEND TO TRIAGE?     No travel    Has the patient had contact with any person who has c

## 2021-01-28 NOTE — TELEPHONE ENCOUNTER
T.EDITH. to pt. Dr. Jesse Cherry did advise pt to get COVID tested. An order was placed. Pt was given the number to call and schedule his test. He was instructed to self quarantine while waiting for his results.  We will follow up with pt with further instructions

## 2021-01-29 ENCOUNTER — LAB ENCOUNTER (OUTPATIENT)
Dept: LAB | Age: 66
End: 2021-01-29
Attending: FAMILY MEDICINE
Payer: MEDICARE

## 2021-01-29 DIAGNOSIS — Z20.822 EXPOSURE TO COVID-19 VIRUS: Primary | ICD-10-CM

## 2021-01-30 LAB — SARS-COV-2 RNA RESP QL NAA+PROBE: NOT DETECTED

## 2021-02-02 ENCOUNTER — TELEPHONE (OUTPATIENT)
Dept: FAMILY MEDICINE CLINIC | Facility: CLINIC | Age: 66
End: 2021-02-02

## 2021-02-02 DIAGNOSIS — J45.30 MILD PERSISTENT ASTHMA WITHOUT COMPLICATION: ICD-10-CM

## 2021-02-02 RX ORDER — FLUTICASONE PROPIONATE AND SALMETEROL 250; 50 UG/1; UG/1
POWDER RESPIRATORY (INHALATION)
Qty: 60 EACH | Refills: 2 | Status: SHIPPED | OUTPATIENT
Start: 2021-02-02 | End: 2021-05-06

## 2021-03-12 DIAGNOSIS — Z23 NEED FOR VACCINATION: ICD-10-CM

## 2021-05-03 DIAGNOSIS — J45.30 MILD PERSISTENT ASTHMA WITHOUT COMPLICATION: ICD-10-CM

## 2021-05-06 RX ORDER — FLUTICASONE PROPIONATE AND SALMETEROL 250; 50 UG/1; UG/1
POWDER RESPIRATORY (INHALATION)
Qty: 60 EACH | Refills: 2 | Status: SHIPPED | OUTPATIENT
Start: 2021-05-06 | End: 2021-08-05

## 2021-05-13 ENCOUNTER — OFFICE VISIT (OUTPATIENT)
Dept: FAMILY MEDICINE CLINIC | Facility: CLINIC | Age: 66
End: 2021-05-13
Payer: MEDICARE

## 2021-05-13 ENCOUNTER — PATIENT MESSAGE (OUTPATIENT)
Dept: FAMILY MEDICINE CLINIC | Facility: CLINIC | Age: 66
End: 2021-05-13

## 2021-05-13 VITALS
BODY MASS INDEX: 23.43 KG/M2 | WEIGHT: 173 LBS | SYSTOLIC BLOOD PRESSURE: 138 MMHG | TEMPERATURE: 97 F | DIASTOLIC BLOOD PRESSURE: 82 MMHG | HEART RATE: 56 BPM | RESPIRATION RATE: 16 BRPM | HEIGHT: 72 IN

## 2021-05-13 DIAGNOSIS — N40.0 BPH WITHOUT URINARY OBSTRUCTION: ICD-10-CM

## 2021-05-13 DIAGNOSIS — I10 ESSENTIAL HYPERTENSION: Primary | ICD-10-CM

## 2021-05-13 DIAGNOSIS — J45.41 MODERATE PERSISTENT ASTHMA WITH EXACERBATION: ICD-10-CM

## 2021-05-13 DIAGNOSIS — Q23.1 BICUSPID AORTIC VALVE: ICD-10-CM

## 2021-05-13 DIAGNOSIS — I77.810 DILATED AORTIC ROOT (HCC): ICD-10-CM

## 2021-05-13 PROCEDURE — 99214 OFFICE O/P EST MOD 30 MIN: CPT | Performed by: FAMILY MEDICINE

## 2021-05-13 PROCEDURE — 3075F SYST BP GE 130 - 139MM HG: CPT | Performed by: FAMILY MEDICINE

## 2021-05-13 PROCEDURE — 3008F BODY MASS INDEX DOCD: CPT | Performed by: FAMILY MEDICINE

## 2021-05-13 PROCEDURE — 3079F DIAST BP 80-89 MM HG: CPT | Performed by: FAMILY MEDICINE

## 2021-05-13 NOTE — PROGRESS NOTES
Sol Mclean is a 76year old male. HPI:   Pt is here for follow-up of his hypertension and asthma. He states he is doing well on his Advair 250-5 1 puff twice daily. Has a history of Raynaud's phenomenon, mainly in the winter.  Patient presents for mouth daily. 90 capsule 3   • losartan Potassium 50 MG Oral Tab Take 50 mg by mouth daily. • dilTIAZem HCl ER Coated Beads 120 MG Oral Capsule SR 24 Hr Take 120 mg by mouth daily.      • Albuterol Sulfate  (90 Base) MCG/ACT Inhalation Aero Soln I obstruction    Orders Placed This Encounter      CBC W Differential W Platelet      Comp Metabolic Panel (14)      Lipid Panel      PSA      Meds & Refills for this Visit:  Requested Prescriptions      No prescriptions requested or ordered in this encounte

## 2021-05-13 NOTE — TELEPHONE ENCOUNTER
From: Eder Hopper  To: Jodi Gil MD  Sent: 5/13/2021 12:04 PM CDT  Subject: Other    Attached is a copy of my 'COVID-19 Vaccination Record Card' which shows that I received the vaccination on 04/07/2021.  Dr. Donna Diego requested that I send a co

## 2021-06-03 RX ORDER — LOSARTAN POTASSIUM 50 MG/1
50 TABLET ORAL DAILY
Qty: 90 TABLET | Refills: 0 | Status: SHIPPED | OUTPATIENT
Start: 2021-06-03

## 2021-06-04 ENCOUNTER — TELEPHONE (OUTPATIENT)
Dept: FAMILY MEDICINE CLINIC | Facility: CLINIC | Age: 66
End: 2021-06-04

## 2021-08-05 DIAGNOSIS — J45.30 MILD PERSISTENT ASTHMA WITHOUT COMPLICATION: ICD-10-CM

## 2021-08-05 RX ORDER — FLUTICASONE PROPIONATE AND SALMETEROL 250; 50 UG/1; UG/1
POWDER RESPIRATORY (INHALATION)
Qty: 60 EACH | Refills: 2 | Status: SHIPPED | OUTPATIENT
Start: 2021-08-05 | End: 2021-11-08

## 2021-08-06 ENCOUNTER — OFFICE VISIT (OUTPATIENT)
Dept: OCCUPATIONAL MEDICINE | Age: 66
End: 2021-08-06
Attending: PHYSICIAN ASSISTANT

## 2021-08-06 ENCOUNTER — NURSE ONLY (OUTPATIENT)
Dept: OTHER | Facility: HOSPITAL | Age: 66
End: 2021-08-06
Attending: FAMILY MEDICINE

## 2021-08-06 DIAGNOSIS — Z02.1 PHYSICAL EXAM, PRE-EMPLOYMENT: Primary | ICD-10-CM

## 2021-08-06 LAB
ALBUMIN SERPL-MCNC: 4 G/DL (ref 3.4–5)
ALP LIVER SERPL-CCNC: 69 U/L
ALT SERPL-CCNC: 33 U/L
AST SERPL-CCNC: 30 U/L (ref 15–37)
BASOPHILS # BLD AUTO: 0.06 X10(3) UL (ref 0–0.2)
BASOPHILS NFR BLD AUTO: 0.6 %
BILIRUB DIRECT SERPL-MCNC: 0.2 MG/DL (ref 0–0.2)
BILIRUB SERPL-MCNC: 0.5 MG/DL (ref 0.1–2)
BILIRUB UR QL STRIP.AUTO: NEGATIVE
CLARITY UR REFRACT.AUTO: CLEAR
COLOR UR AUTO: YELLOW
EOSINOPHIL # BLD AUTO: 0.78 X10(3) UL (ref 0–0.7)
EOSINOPHIL NFR BLD AUTO: 8.3 %
ERYTHROCYTE [DISTWIDTH] IN BLOOD BY AUTOMATED COUNT: 13.7 %
GLUCOSE UR STRIP.AUTO-MCNC: NEGATIVE MG/DL
HCT VFR BLD AUTO: 42.2 %
HGB BLD-MCNC: 13.7 G/DL
IMM GRANULOCYTES # BLD AUTO: 0.04 X10(3) UL (ref 0–1)
IMM GRANULOCYTES NFR BLD: 0.4 %
KETONES UR STRIP.AUTO-MCNC: NEGATIVE MG/DL
LEUKOCYTE ESTERASE UR QL STRIP.AUTO: NEGATIVE
LYMPHOCYTES # BLD AUTO: 2.16 X10(3) UL (ref 1–4)
LYMPHOCYTES NFR BLD AUTO: 22.9 %
M PROTEIN MFR SERPL ELPH: 7.6 G/DL (ref 6.4–8.2)
MCH RBC QN AUTO: 29.9 PG (ref 26–34)
MCHC RBC AUTO-ENTMCNC: 32.5 G/DL (ref 31–37)
MCV RBC AUTO: 92.1 FL
MONOCYTES # BLD AUTO: 0.83 X10(3) UL (ref 0.1–1)
MONOCYTES NFR BLD AUTO: 8.8 %
NEUTROPHILS # BLD AUTO: 5.58 X10 (3) UL (ref 1.5–7.7)
NEUTROPHILS # BLD AUTO: 5.58 X10(3) UL (ref 1.5–7.7)
NEUTROPHILS NFR BLD AUTO: 59 %
NITRITE UR QL STRIP.AUTO: NEGATIVE
PH UR STRIP.AUTO: 6 [PH] (ref 5–8)
PLATELET # BLD AUTO: 266 10(3)UL (ref 150–450)
PROT UR STRIP.AUTO-MCNC: NEGATIVE MG/DL
RBC # BLD AUTO: 4.58 X10(6)UL
RBC #/AREA URNS AUTO: >10 /HPF
SP GR UR STRIP.AUTO: 1.01 (ref 1–1.03)
UROBILINOGEN UR STRIP.AUTO-MCNC: <2 MG/DL
WBC # BLD AUTO: 9.5 X10(3) UL (ref 4–11)

## 2021-08-06 PROCEDURE — 80076 HEPATIC FUNCTION PANEL: CPT

## 2021-08-06 PROCEDURE — 85025 COMPLETE CBC W/AUTO DIFF WBC: CPT

## 2021-08-06 PROCEDURE — 81001 URINALYSIS AUTO W/SCOPE: CPT

## 2021-09-27 ENCOUNTER — OFFICE VISIT (OUTPATIENT)
Dept: FAMILY MEDICINE CLINIC | Facility: CLINIC | Age: 66
End: 2021-09-27
Payer: MEDICARE

## 2021-09-27 ENCOUNTER — LAB ENCOUNTER (OUTPATIENT)
Dept: LAB | Age: 66
End: 2021-09-27
Attending: FAMILY MEDICINE
Payer: MEDICARE

## 2021-09-27 ENCOUNTER — TELEPHONE (OUTPATIENT)
Dept: FAMILY MEDICINE CLINIC | Facility: CLINIC | Age: 66
End: 2021-09-27

## 2021-09-27 VITALS
DIASTOLIC BLOOD PRESSURE: 60 MMHG | TEMPERATURE: 98 F | SYSTOLIC BLOOD PRESSURE: 100 MMHG | HEIGHT: 72 IN | RESPIRATION RATE: 16 BRPM | WEIGHT: 168 LBS | HEART RATE: 64 BPM | BODY MASS INDEX: 22.75 KG/M2

## 2021-09-27 DIAGNOSIS — L30.9 ACUTE ECZEMA: Primary | ICD-10-CM

## 2021-09-27 DIAGNOSIS — I77.810 DILATED AORTIC ROOT (HCC): ICD-10-CM

## 2021-09-27 DIAGNOSIS — J45.41 MODERATE PERSISTENT ASTHMA WITH EXACERBATION: ICD-10-CM

## 2021-09-27 DIAGNOSIS — I10 ESSENTIAL HYPERTENSION: ICD-10-CM

## 2021-09-27 DIAGNOSIS — M79.89 SWELLING OF BOTH LOWER EXTREMITIES: ICD-10-CM

## 2021-09-27 DIAGNOSIS — L30.9 CHRONIC ECZEMA: ICD-10-CM

## 2021-09-27 DIAGNOSIS — Q23.1 BICUSPID AORTIC VALVE: ICD-10-CM

## 2021-09-27 DIAGNOSIS — N40.0 BPH WITHOUT URINARY OBSTRUCTION: ICD-10-CM

## 2021-09-27 LAB
ALBUMIN SERPL-MCNC: 3.5 G/DL (ref 3.4–5)
ALBUMIN/GLOB SERPL: 0.9 {RATIO} (ref 1–2)
ALP LIVER SERPL-CCNC: 76 U/L
ALT SERPL-CCNC: 45 U/L
ANION GAP SERPL CALC-SCNC: 4 MMOL/L (ref 0–18)
AST SERPL-CCNC: 33 U/L (ref 15–37)
BASOPHILS # BLD AUTO: 0.06 X10(3) UL (ref 0–0.2)
BASOPHILS NFR BLD AUTO: 0.5 %
BILIRUB SERPL-MCNC: 0.5 MG/DL (ref 0.1–2)
BUN BLD-MCNC: 15 MG/DL (ref 7–18)
CALCIUM BLD-MCNC: 9.7 MG/DL (ref 8.5–10.1)
CHLORIDE SERPL-SCNC: 108 MMOL/L (ref 98–112)
CHOLEST SERPL-MCNC: 117 MG/DL (ref ?–200)
CO2 SERPL-SCNC: 27 MMOL/L (ref 21–32)
CREAT BLD-MCNC: 0.94 MG/DL
EOSINOPHIL # BLD AUTO: 1.94 X10(3) UL (ref 0–0.7)
EOSINOPHIL NFR BLD AUTO: 16.8 %
ERYTHROCYTE [DISTWIDTH] IN BLOOD BY AUTOMATED COUNT: 13.9 %
GLOBULIN PLAS-MCNC: 3.8 G/DL (ref 2.8–4.4)
GLUCOSE BLD-MCNC: 80 MG/DL (ref 70–99)
HCT VFR BLD AUTO: 42.6 %
HDLC SERPL-MCNC: 62 MG/DL (ref 40–59)
HGB BLD-MCNC: 13.4 G/DL
IMM GRANULOCYTES # BLD AUTO: 0.06 X10(3) UL (ref 0–1)
IMM GRANULOCYTES NFR BLD: 0.5 %
LDLC SERPL CALC-MCNC: 43 MG/DL (ref ?–100)
LYMPHOCYTES # BLD AUTO: 1.7 X10(3) UL (ref 1–4)
LYMPHOCYTES NFR BLD AUTO: 14.7 %
MCH RBC QN AUTO: 29.8 PG (ref 26–34)
MCHC RBC AUTO-ENTMCNC: 31.5 G/DL (ref 31–37)
MCV RBC AUTO: 94.7 FL
MONOCYTES # BLD AUTO: 1.12 X10(3) UL (ref 0.1–1)
MONOCYTES NFR BLD AUTO: 9.7 %
NEUTROPHILS # BLD AUTO: 6.68 X10 (3) UL (ref 1.5–7.7)
NEUTROPHILS # BLD AUTO: 6.68 X10(3) UL (ref 1.5–7.7)
NEUTROPHILS NFR BLD AUTO: 57.8 %
NONHDLC SERPL-MCNC: 55 MG/DL (ref ?–130)
NT-PROBNP SERPL-MCNC: 192 PG/ML (ref ?–125)
OSMOLALITY SERPL CALC.SUM OF ELEC: 288 MOSM/KG (ref 275–295)
PATIENT FASTING Y/N/NP: NO
PATIENT FASTING Y/N/NP: NO
PLATELET # BLD AUTO: 342 10(3)UL (ref 150–450)
POTASSIUM SERPL-SCNC: 4.3 MMOL/L (ref 3.5–5.1)
PROT SERPL-MCNC: 7.3 G/DL (ref 6.4–8.2)
PSA SERPL-MCNC: 3.37 NG/ML (ref ?–4)
RBC # BLD AUTO: 4.5 X10(6)UL
SODIUM SERPL-SCNC: 139 MMOL/L (ref 136–145)
TRIGL SERPL-MCNC: 53 MG/DL (ref 30–149)
VLDLC SERPL CALC-MCNC: 7 MG/DL (ref 0–30)
WBC # BLD AUTO: 11.6 X10(3) UL (ref 4–11)

## 2021-09-27 PROCEDURE — 80061 LIPID PANEL: CPT

## 2021-09-27 PROCEDURE — 3078F DIAST BP <80 MM HG: CPT | Performed by: STUDENT IN AN ORGANIZED HEALTH CARE EDUCATION/TRAINING PROGRAM

## 2021-09-27 PROCEDURE — 83880 ASSAY OF NATRIURETIC PEPTIDE: CPT

## 2021-09-27 PROCEDURE — 3074F SYST BP LT 130 MM HG: CPT | Performed by: STUDENT IN AN ORGANIZED HEALTH CARE EDUCATION/TRAINING PROGRAM

## 2021-09-27 PROCEDURE — 99214 OFFICE O/P EST MOD 30 MIN: CPT | Performed by: STUDENT IN AN ORGANIZED HEALTH CARE EDUCATION/TRAINING PROGRAM

## 2021-09-27 PROCEDURE — 80053 COMPREHEN METABOLIC PANEL: CPT

## 2021-09-27 PROCEDURE — 3008F BODY MASS INDEX DOCD: CPT | Performed by: STUDENT IN AN ORGANIZED HEALTH CARE EDUCATION/TRAINING PROGRAM

## 2021-09-27 PROCEDURE — 84153 ASSAY OF PSA TOTAL: CPT

## 2021-09-27 PROCEDURE — 85025 COMPLETE CBC W/AUTO DIFF WBC: CPT

## 2021-09-27 PROCEDURE — 36415 COLL VENOUS BLD VENIPUNCTURE: CPT

## 2021-09-27 RX ORDER — HYDROXYZINE HYDROCHLORIDE 25 MG/1
25 TABLET, FILM COATED ORAL NIGHTLY PRN
Qty: 30 TABLET | Refills: 0 | Status: SHIPPED | OUTPATIENT
Start: 2021-09-27 | End: 2021-10-25

## 2021-09-27 NOTE — TELEPHONE ENCOUNTER
I spoke with pt.he  is having some problems with b/l leg swelling . He also stated he is having a flair up with eczema. appt made for pt to be evaluated today at 2:00 PM with Dr. Nikole Diaz. Pt verbalized understanding.

## 2021-09-27 NOTE — TELEPHONE ENCOUNTER
1. What are your symptoms? Pt has been experiencing Eczema (very itchy not sure if actually eczema) all summer - states comes and goes. Pt also has swollen feet the past few weeks    3. Have you done anything already to treat your symptoms?      Using

## 2021-09-27 NOTE — PATIENT INSTRUCTIONS
Managing Atopic Dermatitis (Eczema)     After bathing, gently pat your skin dry (don’t rub). Apply moisturizer while your skin is still damp.    To manage your symptoms and help reduce the severity and frequency, try these self-care tips:   Caring for you atopic dermatitis, the next step is up to you. Follow your healthcare provider’s treatment plan and your self-care routine. This will help bring atopic dermatitis under control. If your symptoms persist, be sure to let your health care provider know.    [facility]. Do not freeze the medicine. Cautions  Tell your doctor and pharmacist if you ever had an allergic reaction to a medicine. Symptoms of an allergic reaction can include trouble breathing, skin rash, itching, swelling, or severe dizziness.   Do not u address below. You can also ask your pharmacist for a printout.  If you have any questions, please ask your pharmacist.   © 2021 [address]

## 2021-09-27 NOTE — PROGRESS NOTES
[facility] Family Medicine Note    Chief complaint: Patient presents with:  Swelling: bilateral legs, 2-3 weeks   Rash Skin Problem: life    HPI:   Patient is a 76year old male with a PMH of  has a past medical history of Aortic aneurysm ([address] allergies    EXAM:   /60 (BP Location: Left arm, Patient Position: Sitting, Cuff Size: adult)   Pulse 64   Temp 97.8 °F (36.6 °C) (Oral)   Resp 16   Ht 6' (1.829 m)   Wt 168 lb (76.2 kg)   BMI 22.78 kg/m²  Estimated body mass index is 22.78 kg/m² as Future      Meds & Refills for this Visit:  Requested Prescriptions     Pending Prescriptions Disp Refills   • triamcinolone acetonide 0.1 % External Cream 453.4 g 1     Sig: Apply topically 2 (two) times daily for 7 days.  Then use as needed for eczema fla

## 2021-10-22 DIAGNOSIS — L30.9 CHRONIC ECZEMA: ICD-10-CM

## 2021-10-22 DIAGNOSIS — L30.9 ACUTE ECZEMA: ICD-10-CM

## 2021-10-25 RX ORDER — HYDROXYZINE HYDROCHLORIDE 25 MG/1
25 TABLET, FILM COATED ORAL NIGHTLY
Qty: 30 TABLET | Refills: 0 | Status: SHIPPED | OUTPATIENT
Start: 2021-10-25 | End: 2021-11-23

## 2021-11-08 DIAGNOSIS — J45.30 MILD PERSISTENT ASTHMA WITHOUT COMPLICATION: ICD-10-CM

## 2021-11-08 RX ORDER — FLUTICASONE PROPIONATE AND SALMETEROL 250; 50 UG/1; UG/1
POWDER RESPIRATORY (INHALATION)
Qty: 60 EACH | Refills: 2 | Status: SHIPPED | OUTPATIENT
Start: 2021-11-08

## 2021-11-23 DIAGNOSIS — L30.9 ACUTE ECZEMA: ICD-10-CM

## 2021-11-23 DIAGNOSIS — L30.9 CHRONIC ECZEMA: ICD-10-CM

## 2021-11-23 RX ORDER — HYDROXYZINE HYDROCHLORIDE 25 MG/1
TABLET, FILM COATED ORAL
Qty: 30 TABLET | Refills: 0 | Status: SHIPPED | OUTPATIENT
Start: 2021-11-23 | End: 2021-12-27

## 2021-11-23 NOTE — TELEPHONE ENCOUNTER
Last Refill: 10/25/2021  Amount: 30  LOV: 9/27/2021 acute visit  Asked to Return: 10/2021  NOV: none

## 2021-12-24 DIAGNOSIS — L30.9 CHRONIC ECZEMA: ICD-10-CM

## 2021-12-24 DIAGNOSIS — L30.9 ACUTE ECZEMA: ICD-10-CM

## 2021-12-27 RX ORDER — HYDROXYZINE HYDROCHLORIDE 25 MG/1
TABLET, FILM COATED ORAL
Qty: 30 TABLET | Refills: 0 | Status: SHIPPED | OUTPATIENT
Start: 2021-12-27

## 2021-12-27 NOTE — TELEPHONE ENCOUNTER
Patient should follow up in the office to determine if eczema requires different treatment regimen. The hydroxyzine should be an adjunct to the topical regimen and not long term. Thank you.

## 2021-12-27 NOTE — TELEPHONE ENCOUNTER
Hydroxyzine 25mg is not a protocol medication.  Last OV 9/27/21 for acute rash last refill 11/23/21 #30

## 2022-01-29 ENCOUNTER — TELEPHONE (OUTPATIENT)
Dept: FAMILY MEDICINE CLINIC | Facility: CLINIC | Age: 67
End: 2022-01-29

## 2022-01-31 RX ORDER — HYDROXYZINE HYDROCHLORIDE 25 MG/1
TABLET, FILM COATED ORAL
Qty: 30 TABLET | Refills: 0 | OUTPATIENT
Start: 2022-01-31

## 2022-01-31 NOTE — TELEPHONE ENCOUNTER
Pt has physical scheduled w/Dr. Tu De La Cruz 3/3/2022. Pt states do not refill, he does not use this medication, and does not think that it helps.

## 2022-01-31 NOTE — TELEPHONE ENCOUNTER
Patient will need follow up appointment to re-assess. Hydroxyzine was originally prescribed as adjunct for pruritic eczema. Thank you.

## 2022-02-17 ENCOUNTER — TELEPHONE (OUTPATIENT)
Dept: ADMINISTRATIVE | Age: 67
End: 2022-02-17

## 2022-02-17 NOTE — TELEPHONE ENCOUNTER
Patient requested a new referral to see Cardiology Guicho,Please review and sign plan and care if you agree Thank you. Venkat Stephens V    [facility] REFERRALS.

## 2022-03-03 ENCOUNTER — NURSE ONLY (OUTPATIENT)
Dept: LAB | Age: 67
End: 2022-03-03
Attending: FAMILY MEDICINE
Payer: MEDICARE

## 2022-03-03 ENCOUNTER — OFFICE VISIT (OUTPATIENT)
Dept: FAMILY MEDICINE CLINIC | Facility: CLINIC | Age: 67
End: 2022-03-03
Payer: MEDICARE

## 2022-03-03 VITALS
SYSTOLIC BLOOD PRESSURE: 110 MMHG | HEIGHT: 71 IN | RESPIRATION RATE: 12 BRPM | BODY MASS INDEX: 23.94 KG/M2 | TEMPERATURE: 98 F | DIASTOLIC BLOOD PRESSURE: 70 MMHG | HEART RATE: 60 BPM | WEIGHT: 171 LBS

## 2022-03-03 DIAGNOSIS — J06.9 VIRAL UPPER RESPIRATORY TRACT INFECTION: ICD-10-CM

## 2022-03-03 DIAGNOSIS — J06.9 VIRAL UPPER RESPIRATORY TRACT INFECTION: Primary | ICD-10-CM

## 2022-03-03 PROCEDURE — 3078F DIAST BP <80 MM HG: CPT | Performed by: FAMILY MEDICINE

## 2022-03-03 PROCEDURE — 99213 OFFICE O/P EST LOW 20 MIN: CPT | Performed by: FAMILY MEDICINE

## 2022-03-03 PROCEDURE — 3008F BODY MASS INDEX DOCD: CPT | Performed by: FAMILY MEDICINE

## 2022-03-03 PROCEDURE — 3074F SYST BP LT 130 MM HG: CPT | Performed by: FAMILY MEDICINE

## 2022-03-04 LAB — SARS-COV-2 RNA RESP QL NAA+PROBE: NOT DETECTED

## 2022-03-09 RX ORDER — FLUTICASONE PROPIONATE AND SALMETEROL 250; 50 UG/1; UG/1
POWDER RESPIRATORY (INHALATION)
Qty: 60 EACH | Refills: 2 | Status: SHIPPED | OUTPATIENT
Start: 2022-03-09

## 2022-07-05 ENCOUNTER — OFFICE VISIT (OUTPATIENT)
Dept: FAMILY MEDICINE CLINIC | Facility: CLINIC | Age: 67
End: 2022-07-05
Payer: MEDICARE

## 2022-07-05 ENCOUNTER — LAB ENCOUNTER (OUTPATIENT)
Dept: LAB | Age: 67
End: 2022-07-05
Attending: FAMILY MEDICINE
Payer: MEDICARE

## 2022-07-05 VITALS
HEIGHT: 71 IN | DIASTOLIC BLOOD PRESSURE: 56 MMHG | TEMPERATURE: 97 F | WEIGHT: 165 LBS | SYSTOLIC BLOOD PRESSURE: 98 MMHG | OXYGEN SATURATION: 99 % | RESPIRATION RATE: 16 BRPM | BODY MASS INDEX: 23.1 KG/M2 | HEART RATE: 55 BPM

## 2022-07-05 DIAGNOSIS — Q23.1 BICUSPID AORTIC VALVE: ICD-10-CM

## 2022-07-05 DIAGNOSIS — I10 ESSENTIAL HYPERTENSION: ICD-10-CM

## 2022-07-05 DIAGNOSIS — N40.0 BPH WITHOUT URINARY OBSTRUCTION: ICD-10-CM

## 2022-07-05 DIAGNOSIS — J45.41 MODERATE PERSISTENT ASTHMA WITH EXACERBATION: ICD-10-CM

## 2022-07-05 DIAGNOSIS — Z23 NEED FOR VACCINATION: ICD-10-CM

## 2022-07-05 DIAGNOSIS — Z00.00 ENCOUNTER FOR ANNUAL HEALTH EXAMINATION: ICD-10-CM

## 2022-07-05 DIAGNOSIS — Z11.59 NEED FOR HEPATITIS C SCREENING TEST: ICD-10-CM

## 2022-07-05 DIAGNOSIS — R15.1 FECAL SMEARING: Primary | ICD-10-CM

## 2022-07-05 DIAGNOSIS — I77.810 DILATED AORTIC ROOT (HCC): ICD-10-CM

## 2022-07-05 LAB
ALBUMIN SERPL-MCNC: 4 G/DL (ref 3.4–5)
ALBUMIN/GLOB SERPL: 1.1 {RATIO} (ref 1–2)
ALP LIVER SERPL-CCNC: 65 U/L
ALT SERPL-CCNC: 21 U/L
ANION GAP SERPL CALC-SCNC: 6 MMOL/L (ref 0–18)
AST SERPL-CCNC: 25 U/L (ref 15–37)
BASOPHILS # BLD AUTO: 0.07 X10(3) UL (ref 0–0.2)
BASOPHILS NFR BLD AUTO: 0.8 %
BILIRUB SERPL-MCNC: 0.8 MG/DL (ref 0.1–2)
BUN BLD-MCNC: 15 MG/DL (ref 7–18)
CALCIUM BLD-MCNC: 9.3 MG/DL (ref 8.5–10.1)
CHLORIDE SERPL-SCNC: 108 MMOL/L (ref 98–112)
CHOLEST SERPL-MCNC: 135 MG/DL (ref ?–200)
CO2 SERPL-SCNC: 26 MMOL/L (ref 21–32)
CREAT BLD-MCNC: 1.02 MG/DL
EOSINOPHIL # BLD AUTO: 0.33 X10(3) UL (ref 0–0.7)
EOSINOPHIL NFR BLD AUTO: 3.6 %
ERYTHROCYTE [DISTWIDTH] IN BLOOD BY AUTOMATED COUNT: 13.7 %
FASTING PATIENT LIPID ANSWER: YES
FASTING STATUS PATIENT QL REPORTED: YES
GLOBULIN PLAS-MCNC: 3.7 G/DL (ref 2.8–4.4)
GLUCOSE BLD-MCNC: 88 MG/DL (ref 70–99)
HCT VFR BLD AUTO: 44.7 %
HCV AB SERPL QL IA: NONREACTIVE
HDLC SERPL-MCNC: 71 MG/DL (ref 40–59)
HGB BLD-MCNC: 14.7 G/DL
IMM GRANULOCYTES # BLD AUTO: 0.03 X10(3) UL (ref 0–1)
IMM GRANULOCYTES NFR BLD: 0.3 %
LDLC SERPL CALC-MCNC: 55 MG/DL (ref ?–100)
LYMPHOCYTES # BLD AUTO: 2.37 X10(3) UL (ref 1–4)
LYMPHOCYTES NFR BLD AUTO: 26.2 %
MCH RBC QN AUTO: 30.6 PG (ref 26–34)
MCHC RBC AUTO-ENTMCNC: 32.9 G/DL (ref 31–37)
MCV RBC AUTO: 92.9 FL
MONOCYTES # BLD AUTO: 0.62 X10(3) UL (ref 0.1–1)
MONOCYTES NFR BLD AUTO: 6.8 %
NEUTROPHILS # BLD AUTO: 5.64 X10 (3) UL (ref 1.5–7.7)
NEUTROPHILS # BLD AUTO: 5.64 X10(3) UL (ref 1.5–7.7)
NEUTROPHILS NFR BLD AUTO: 62.3 %
NONHDLC SERPL-MCNC: 64 MG/DL (ref ?–130)
OSMOLALITY SERPL CALC.SUM OF ELEC: 290 MOSM/KG (ref 275–295)
PLATELET # BLD AUTO: 219 10(3)UL (ref 150–450)
POTASSIUM SERPL-SCNC: 4.5 MMOL/L (ref 3.5–5.1)
PROT SERPL-MCNC: 7.7 G/DL (ref 6.4–8.2)
PSA SERPL-MCNC: 3.99 NG/ML (ref ?–4)
RBC # BLD AUTO: 4.81 X10(6)UL
SODIUM SERPL-SCNC: 140 MMOL/L (ref 136–145)
TRIGL SERPL-MCNC: 37 MG/DL (ref 30–149)
VLDLC SERPL CALC-MCNC: 5 MG/DL (ref 0–30)
WBC # BLD AUTO: 9.1 X10(3) UL (ref 4–11)

## 2022-07-05 PROCEDURE — 80053 COMPREHEN METABOLIC PANEL: CPT

## 2022-07-05 PROCEDURE — 84153 ASSAY OF PSA TOTAL: CPT

## 2022-07-05 PROCEDURE — 85025 COMPLETE CBC W/AUTO DIFF WBC: CPT

## 2022-07-05 PROCEDURE — 80061 LIPID PANEL: CPT

## 2022-07-05 PROCEDURE — 86803 HEPATITIS C AB TEST: CPT

## 2022-07-05 RX ORDER — FLUTICASONE PROPIONATE AND SALMETEROL 50; 250 UG/1; UG/1
1 POWDER RESPIRATORY (INHALATION) 2 TIMES DAILY
COMMUNITY
Start: 2022-03-10 | End: 2022-07-05

## 2022-07-09 DIAGNOSIS — J45.30 MILD PERSISTENT ASTHMA WITHOUT COMPLICATION: ICD-10-CM

## 2022-07-11 RX ORDER — FLUTICASONE PROPIONATE AND SALMETEROL 50; 250 UG/1; UG/1
POWDER RESPIRATORY (INHALATION)
Qty: 60 EACH | Refills: 2 | Status: SHIPPED | OUTPATIENT
Start: 2022-07-11 | End: 2022-10-07

## 2022-10-06 DIAGNOSIS — J45.30 MILD PERSISTENT ASTHMA WITHOUT COMPLICATION: ICD-10-CM

## 2022-10-07 RX ORDER — FLUTICASONE PROPIONATE AND SALMETEROL 50; 250 UG/1; UG/1
POWDER RESPIRATORY (INHALATION)
Qty: 60 EACH | Refills: 2 | Status: SHIPPED | OUTPATIENT
Start: 2022-10-07

## 2022-10-12 ENCOUNTER — ORDER TRANSCRIPTION (OUTPATIENT)
Dept: ADMINISTRATIVE | Facility: HOSPITAL | Age: 67
End: 2022-10-12

## 2022-10-12 DIAGNOSIS — Z01.812 PRE-PROCEDURE LAB EXAM: Primary | ICD-10-CM

## 2022-10-12 DIAGNOSIS — Z11.59 ENCOUNTER FOR SCREENING FOR OTHER VIRAL DISEASES: ICD-10-CM

## 2022-10-21 ENCOUNTER — MED REC SCAN ONLY (OUTPATIENT)
Dept: FAMILY MEDICINE CLINIC | Facility: CLINIC | Age: 67
End: 2022-10-21

## 2022-11-01 ENCOUNTER — LAB ENCOUNTER (OUTPATIENT)
Dept: LAB | Age: 67
End: 2022-11-01
Attending: INTERNAL MEDICINE
Payer: MEDICARE

## 2022-11-01 ENCOUNTER — LAB ENCOUNTER (OUTPATIENT)
Dept: LAB | Facility: HOSPITAL | Age: 67
End: 2022-11-01
Attending: INTERNAL MEDICINE
Payer: MEDICARE

## 2022-11-01 DIAGNOSIS — Z11.59 ENCOUNTER FOR SCREENING FOR OTHER VIRAL DISEASES: ICD-10-CM

## 2022-11-01 DIAGNOSIS — Z01.812 PRE-PROCEDURE LAB EXAM: ICD-10-CM

## 2022-11-02 LAB — SARS-COV-2 RNA RESP QL NAA+PROBE: NOT DETECTED

## 2023-01-30 DIAGNOSIS — J45.30 MILD PERSISTENT ASTHMA WITHOUT COMPLICATION: ICD-10-CM

## 2023-01-30 RX ORDER — FLUTICASONE PROPIONATE AND SALMETEROL 250; 50 UG/1; UG/1
POWDER RESPIRATORY (INHALATION)
Qty: 60 EACH | Refills: 0 | Status: SHIPPED | OUTPATIENT
Start: 2023-01-30

## 2023-02-20 ENCOUNTER — OFFICE VISIT (OUTPATIENT)
Dept: FAMILY MEDICINE CLINIC | Facility: CLINIC | Age: 68
End: 2023-02-20
Payer: MEDICARE

## 2023-02-20 VITALS
BODY MASS INDEX: 23.66 KG/M2 | SYSTOLIC BLOOD PRESSURE: 128 MMHG | HEIGHT: 71 IN | WEIGHT: 169 LBS | DIASTOLIC BLOOD PRESSURE: 82 MMHG | RESPIRATION RATE: 16 BRPM | TEMPERATURE: 98 F | HEART RATE: 56 BPM

## 2023-02-20 DIAGNOSIS — Q23.1 BICUSPID AORTIC VALVE: ICD-10-CM

## 2023-02-20 DIAGNOSIS — J45.41 MODERATE PERSISTENT ASTHMA WITH EXACERBATION: ICD-10-CM

## 2023-02-20 DIAGNOSIS — I77.810 DILATED AORTIC ROOT (HCC): ICD-10-CM

## 2023-02-20 DIAGNOSIS — I10 ESSENTIAL HYPERTENSION: Primary | ICD-10-CM

## 2023-02-20 DIAGNOSIS — N40.0 BPH WITHOUT URINARY OBSTRUCTION: ICD-10-CM

## 2023-02-20 PROCEDURE — 3079F DIAST BP 80-89 MM HG: CPT | Performed by: FAMILY MEDICINE

## 2023-02-20 PROCEDURE — 3008F BODY MASS INDEX DOCD: CPT | Performed by: FAMILY MEDICINE

## 2023-02-20 PROCEDURE — 3074F SYST BP LT 130 MM HG: CPT | Performed by: FAMILY MEDICINE

## 2023-02-20 PROCEDURE — 99214 OFFICE O/P EST MOD 30 MIN: CPT | Performed by: FAMILY MEDICINE

## 2023-02-20 RX ORDER — SILODOSIN 4 MG/1
4 CAPSULE ORAL DAILY
Qty: 60 CAPSULE | Refills: 0 | Status: SHIPPED | OUTPATIENT
Start: 2023-02-20 | End: 2023-03-22

## 2023-02-21 RX ORDER — TAMSULOSIN HYDROCHLORIDE 0.4 MG/1
0.4 CAPSULE ORAL DAILY
Qty: 60 CAPSULE | Refills: 2 | Status: SHIPPED | OUTPATIENT
Start: 2023-02-21 | End: 2023-03-23

## 2023-02-21 NOTE — TELEPHONE ENCOUNTER
Medication changed: per insurance, will not cover. Per Dr. Kwan Dean will try Tamsulosin 0.4 mg daily, # 60/2 refills.

## 2023-06-20 ENCOUNTER — PATIENT MESSAGE (OUTPATIENT)
Dept: FAMILY MEDICINE CLINIC | Facility: CLINIC | Age: 68
End: 2023-06-20

## 2023-06-20 NOTE — TELEPHONE ENCOUNTER
From: Shayna Elliott  To: Spencer Schrader MD  Sent: 6/20/2023 12:33 PM CDT  Subject: \"Blood work\"    I have my annual physical with Dr. Antonella Gant on July 11th. I think he wants my 'blood work' done beforehand, but I'm not sure if he sent an order for it to the lab. Can you verify if I can schedule an appointment to have the blood tests done before I visit with the doctor?     Thanks,    Tg Pablo

## 2023-06-28 ENCOUNTER — LAB ENCOUNTER (OUTPATIENT)
Dept: LAB | Age: 68
End: 2023-06-28
Attending: FAMILY MEDICINE
Payer: MEDICARE

## 2023-06-28 DIAGNOSIS — I77.810 DILATED AORTIC ROOT (HCC): ICD-10-CM

## 2023-06-28 DIAGNOSIS — N40.0 BPH WITHOUT URINARY OBSTRUCTION: ICD-10-CM

## 2023-06-28 DIAGNOSIS — I10 ESSENTIAL HYPERTENSION: ICD-10-CM

## 2023-06-28 LAB
ALBUMIN SERPL-MCNC: 4.2 G/DL (ref 3.4–5)
ALBUMIN/GLOB SERPL: 1.4 {RATIO} (ref 1–2)
ALP LIVER SERPL-CCNC: 73 U/L
ALT SERPL-CCNC: 32 U/L
ANION GAP SERPL CALC-SCNC: 2 MMOL/L (ref 0–18)
AST SERPL-CCNC: 27 U/L (ref 15–37)
BASOPHILS # BLD AUTO: 0.07 X10(3) UL (ref 0–0.2)
BASOPHILS NFR BLD AUTO: 1.1 %
BILIRUB SERPL-MCNC: 0.6 MG/DL (ref 0.1–2)
BUN BLD-MCNC: 13 MG/DL (ref 7–18)
CALCIUM BLD-MCNC: 9 MG/DL (ref 8.5–10.1)
CHLORIDE SERPL-SCNC: 106 MMOL/L (ref 98–112)
CHOLEST SERPL-MCNC: 145 MG/DL (ref ?–200)
CO2 SERPL-SCNC: 27 MMOL/L (ref 21–32)
CREAT BLD-MCNC: 0.94 MG/DL
EOSINOPHIL # BLD AUTO: 0.4 X10(3) UL (ref 0–0.7)
EOSINOPHIL NFR BLD AUTO: 6.5 %
ERYTHROCYTE [DISTWIDTH] IN BLOOD BY AUTOMATED COUNT: 13.9 %
FASTING PATIENT LIPID ANSWER: YES
FASTING STATUS PATIENT QL REPORTED: YES
GFR SERPLBLD BASED ON 1.73 SQ M-ARVRAT: 88 ML/MIN/1.73M2 (ref 60–?)
GLOBULIN PLAS-MCNC: 3.1 G/DL (ref 2.8–4.4)
GLUCOSE BLD-MCNC: 76 MG/DL (ref 70–99)
HCT VFR BLD AUTO: 43.8 %
HDLC SERPL-MCNC: 78 MG/DL (ref 40–59)
HGB BLD-MCNC: 14 G/DL
IMM GRANULOCYTES # BLD AUTO: 0.02 X10(3) UL (ref 0–1)
IMM GRANULOCYTES NFR BLD: 0.3 %
LDLC SERPL CALC-MCNC: 59 MG/DL (ref ?–100)
LYMPHOCYTES # BLD AUTO: 1.57 X10(3) UL (ref 1–4)
LYMPHOCYTES NFR BLD AUTO: 25.3 %
MCH RBC QN AUTO: 30 PG (ref 26–34)
MCHC RBC AUTO-ENTMCNC: 32 G/DL (ref 31–37)
MCV RBC AUTO: 94 FL
MONOCYTES # BLD AUTO: 0.49 X10(3) UL (ref 0.1–1)
MONOCYTES NFR BLD AUTO: 7.9 %
NEUTROPHILS # BLD AUTO: 3.65 X10 (3) UL (ref 1.5–7.7)
NEUTROPHILS # BLD AUTO: 3.65 X10(3) UL (ref 1.5–7.7)
NEUTROPHILS NFR BLD AUTO: 58.9 %
NONHDLC SERPL-MCNC: 67 MG/DL (ref ?–130)
OSMOLALITY SERPL CALC.SUM OF ELEC: 279 MOSM/KG (ref 275–295)
PLATELET # BLD AUTO: 239 10(3)UL (ref 150–450)
POTASSIUM SERPL-SCNC: 4.3 MMOL/L (ref 3.5–5.1)
PROT SERPL-MCNC: 7.3 G/DL (ref 6.4–8.2)
PSA SERPL-MCNC: 4.43 NG/ML (ref ?–4)
RBC # BLD AUTO: 4.66 X10(6)UL
SODIUM SERPL-SCNC: 135 MMOL/L (ref 136–145)
TRIGL SERPL-MCNC: 31 MG/DL (ref 30–149)
VLDLC SERPL CALC-MCNC: 5 MG/DL (ref 0–30)
WBC # BLD AUTO: 6.2 X10(3) UL (ref 4–11)

## 2023-06-28 PROCEDURE — 80053 COMPREHEN METABOLIC PANEL: CPT

## 2023-06-28 PROCEDURE — 84153 ASSAY OF PSA TOTAL: CPT

## 2023-06-28 PROCEDURE — 80061 LIPID PANEL: CPT

## 2023-06-28 PROCEDURE — 85025 COMPLETE CBC W/AUTO DIFF WBC: CPT

## 2023-07-11 ENCOUNTER — OFFICE VISIT (OUTPATIENT)
Dept: FAMILY MEDICINE CLINIC | Facility: CLINIC | Age: 68
End: 2023-07-11
Payer: MEDICARE

## 2023-07-11 VITALS
DIASTOLIC BLOOD PRESSURE: 60 MMHG | HEART RATE: 64 BPM | TEMPERATURE: 98 F | BODY MASS INDEX: 23.1 KG/M2 | HEIGHT: 71 IN | WEIGHT: 165 LBS | SYSTOLIC BLOOD PRESSURE: 100 MMHG | RESPIRATION RATE: 12 BRPM

## 2023-07-11 DIAGNOSIS — R97.20 ELEVATED PSA, LESS THAN 10 NG/ML: ICD-10-CM

## 2023-07-11 DIAGNOSIS — J45.41 MODERATE PERSISTENT ASTHMA WITH EXACERBATION: ICD-10-CM

## 2023-07-11 DIAGNOSIS — I10 ESSENTIAL HYPERTENSION: ICD-10-CM

## 2023-07-11 DIAGNOSIS — Z86.010 HISTORY OF COLONIC POLYPS: ICD-10-CM

## 2023-07-11 DIAGNOSIS — Q23.1 BICUSPID AORTIC VALVE: ICD-10-CM

## 2023-07-11 DIAGNOSIS — Z00.00 ENCOUNTER FOR ANNUAL HEALTH EXAMINATION: Primary | ICD-10-CM

## 2023-07-11 DIAGNOSIS — I77.810 DILATED AORTIC ROOT (HCC): ICD-10-CM

## 2023-07-11 DIAGNOSIS — N40.0 BPH WITHOUT URINARY OBSTRUCTION: ICD-10-CM

## 2023-07-11 PROCEDURE — 1126F AMNT PAIN NOTED NONE PRSNT: CPT | Performed by: FAMILY MEDICINE

## 2023-07-11 PROCEDURE — 3074F SYST BP LT 130 MM HG: CPT | Performed by: FAMILY MEDICINE

## 2023-07-11 PROCEDURE — 1160F RVW MEDS BY RX/DR IN RCRD: CPT | Performed by: FAMILY MEDICINE

## 2023-07-11 PROCEDURE — 96160 PT-FOCUSED HLTH RISK ASSMT: CPT | Performed by: FAMILY MEDICINE

## 2023-07-11 PROCEDURE — G0439 PPPS, SUBSEQ VISIT: HCPCS | Performed by: FAMILY MEDICINE

## 2023-07-11 PROCEDURE — 1170F FXNL STATUS ASSESSED: CPT | Performed by: FAMILY MEDICINE

## 2023-07-11 PROCEDURE — 3008F BODY MASS INDEX DOCD: CPT | Performed by: FAMILY MEDICINE

## 2023-07-11 PROCEDURE — 3078F DIAST BP <80 MM HG: CPT | Performed by: FAMILY MEDICINE

## 2023-07-11 PROCEDURE — 99213 OFFICE O/P EST LOW 20 MIN: CPT | Performed by: FAMILY MEDICINE

## 2023-07-11 PROCEDURE — 1159F MED LIST DOCD IN RCRD: CPT | Performed by: FAMILY MEDICINE

## 2023-07-27 ENCOUNTER — HOSPITAL ENCOUNTER (OUTPATIENT)
Dept: CV DIAGNOSTICS | Facility: HOSPITAL | Age: 68
Discharge: HOME | End: 2023-07-27
Attending: FAMILY MEDICINE
Payer: MEDICARE

## 2023-07-27 DIAGNOSIS — I77.810 DILATED AORTIC ROOT (HCC): ICD-10-CM

## 2023-07-27 DIAGNOSIS — Q23.1 BICUSPID AORTIC VALVE: ICD-10-CM

## 2023-07-27 PROCEDURE — 93306 TTE W/DOPPLER COMPLETE: CPT | Performed by: FAMILY MEDICINE

## 2023-08-14 RX ORDER — TAMSULOSIN HYDROCHLORIDE 0.4 MG/1
0.4 CAPSULE ORAL DAILY
Qty: 90 CAPSULE | Refills: 1 | Status: SHIPPED | OUTPATIENT
Start: 2023-08-14

## 2023-10-02 ENCOUNTER — OFFICE VISIT (OUTPATIENT)
Dept: SURGERY | Facility: CLINIC | Age: 68
End: 2023-10-02

## 2023-10-02 DIAGNOSIS — N13.8 BPH WITH OBSTRUCTION/LOWER URINARY TRACT SYMPTOMS: ICD-10-CM

## 2023-10-02 DIAGNOSIS — N40.1 BPH WITH OBSTRUCTION/LOWER URINARY TRACT SYMPTOMS: ICD-10-CM

## 2023-10-02 DIAGNOSIS — N20.0 RECURRENT KIDNEY STONES: ICD-10-CM

## 2023-10-02 DIAGNOSIS — R97.20 ELEVATED PSA: Primary | ICD-10-CM

## 2023-10-02 DIAGNOSIS — R35.1 NOCTURIA: ICD-10-CM

## 2023-10-02 DIAGNOSIS — R82.90 URINE FINDING: ICD-10-CM

## 2023-10-02 LAB
APPEARANCE: CLEAR
BILIRUBIN: NEGATIVE
GLUCOSE (URINE DIPSTICK): NEGATIVE MG/DL
KETONES (URINE DIPSTICK): NEGATIVE MG/DL
MULTISTIX LOT#: ABNORMAL NUMERIC
NITRITE, URINE: NEGATIVE
OCCULT BLOOD: NEGATIVE
PH, URINE: 6 (ref 4.5–8)
PROTEIN (URINE DIPSTICK): NEGATIVE MG/DL
SPECIFIC GRAVITY: 1.02 (ref 1–1.03)
URINE-COLOR: YELLOW
UROBILINOGEN,SEMI-QN: 0.2 MG/DL (ref 0–1.9)

## 2023-10-02 PROCEDURE — 1159F MED LIST DOCD IN RCRD: CPT | Performed by: UROLOGY

## 2023-10-02 PROCEDURE — 81003 URINALYSIS AUTO W/O SCOPE: CPT | Performed by: UROLOGY

## 2023-10-02 PROCEDURE — 51798 US URINE CAPACITY MEASURE: CPT | Performed by: UROLOGY

## 2023-10-02 PROCEDURE — 1160F RVW MEDS BY RX/DR IN RCRD: CPT | Performed by: UROLOGY

## 2023-10-02 PROCEDURE — 99205 OFFICE O/P NEW HI 60 MIN: CPT | Performed by: UROLOGY

## 2023-10-02 RX ORDER — DILTIAZEM HYDROCHLORIDE 120 MG/1
120 CAPSULE, EXTENDED RELEASE ORAL DAILY
COMMUNITY
Start: 2023-07-17

## 2023-10-02 RX ORDER — TAMSULOSIN HYDROCHLORIDE 0.4 MG/1
0.8 CAPSULE ORAL EVERY EVENING
Qty: 180 CAPSULE | Refills: 5 | Status: SHIPPED | OUTPATIENT
Start: 2023-10-02

## 2023-10-02 RX ORDER — FINASTERIDE 5 MG/1
5 TABLET, FILM COATED ORAL DAILY
Qty: 90 TABLET | Refills: 6 | Status: SHIPPED | OUTPATIENT
Start: 2023-10-02

## 2023-10-02 NOTE — PATIENT INSTRUCTIONS
1. Increase water intake to 40-60 ounces (2 liters) per day or enough to keep urine clear to pale yellow. 2. Start finasteride and double tamsulosin (0.8 mg)  3. Start three times daily post-void CIC and intermittently record values to your next appointment.

## 2023-10-03 ENCOUNTER — TELEPHONE (OUTPATIENT)
Dept: SURGERY | Facility: CLINIC | Age: 68
End: 2023-10-03

## 2023-10-03 NOTE — TELEPHONE ENCOUNTER
Called pt to discuss below. Advised pt that this is normal. Pt will measure how much urine comes out when the caths. Will update us. Pt is doing well. No other complaints.

## 2023-10-17 ENCOUNTER — PATIENT MESSAGE (OUTPATIENT)
Dept: FAMILY MEDICINE CLINIC | Facility: CLINIC | Age: 68
End: 2023-10-17

## 2023-10-17 DIAGNOSIS — J45.30 MILD PERSISTENT ASTHMA WITHOUT COMPLICATION: ICD-10-CM

## 2023-10-17 RX ORDER — FLUTICASONE PROPIONATE AND SALMETEROL 250; 50 UG/1; UG/1
1 POWDER RESPIRATORY (INHALATION) EVERY 12 HOURS
Qty: 180 EACH | Refills: 0
Start: 2023-10-17

## 2023-10-17 NOTE — TELEPHONE ENCOUNTER
From: Harvey John  To: Connie Finnegan. Rafael Adela  Sent: 10/17/2023 2:42 PM CDT  Subject: Christophnick Bench Prescription    I'm trying to get my Wixela prescription refilled. [place] [employer] not allow this. It seems like [facility] has contacted you for authorization to refill this prescription, but apparently they haven't heard back from you. I only have a few days left on this prescription.     Thanks,    Soren Salgado

## 2023-10-17 NOTE — TELEPHONE ENCOUNTER
LOV:  07/11/2023 for: MA super visit   Patient advised to RTC on:  6 months (on 1/11/2024) for Asthma, Hypertension. Medication Quantity Refills Start End   fluticasone-salmeterol (Dany Elena) 250-50 MCG/ACT Inhalation Aerosol Powder, Breath Activated 180 each 0 1/31/2023    Sig:   Inhale 1 puff into the lungs Q12H.

## 2023-10-17 NOTE — TELEPHONE ENCOUNTER
Patient needs follow-up appointment pt presents to ED s/p trip and fall down 2 steps hit head + LOC on coumadin

## 2023-10-19 DIAGNOSIS — J45.30 MILD PERSISTENT ASTHMA WITHOUT COMPLICATION: ICD-10-CM

## 2023-10-19 RX ORDER — FLUTICASONE PROPIONATE AND SALMETEROL 250; 50 UG/1; UG/1
1 POWDER RESPIRATORY (INHALATION) EVERY 12 HOURS
Qty: 180 EACH | Refills: 0 | Status: SHIPPED | OUTPATIENT
Start: 2023-10-19

## 2023-10-20 ENCOUNTER — OFFICE VISIT (OUTPATIENT)
Dept: FAMILY MEDICINE CLINIC | Facility: CLINIC | Age: 68
End: 2023-10-20
Payer: MEDICARE

## 2023-10-20 VITALS
BODY MASS INDEX: 22.82 KG/M2 | DIASTOLIC BLOOD PRESSURE: 46 MMHG | TEMPERATURE: 97 F | WEIGHT: 163 LBS | HEIGHT: 71 IN | RESPIRATION RATE: 18 BRPM | SYSTOLIC BLOOD PRESSURE: 98 MMHG | HEART RATE: 65 BPM

## 2023-10-20 DIAGNOSIS — Z12.11 SCREENING FOR MALIGNANT NEOPLASM OF COLON: ICD-10-CM

## 2023-10-20 DIAGNOSIS — J45.30 MILD PERSISTENT ASTHMA WITHOUT COMPLICATION: Primary | ICD-10-CM

## 2023-10-20 DIAGNOSIS — Z23 NEED FOR VACCINATION: ICD-10-CM

## 2023-10-20 PROBLEM — I47.10 PAROXYSMAL SUPRAVENTRICULAR TACHYCARDIA: Status: ACTIVE | Noted: 2018-10-17

## 2023-10-20 PROBLEM — I71.21 THORACIC ASCENDING AORTIC ANEURYSM (HCC): Status: ACTIVE | Noted: 2018-10-17

## 2023-10-20 PROBLEM — I47.10 SVT (SUPRAVENTRICULAR TACHYCARDIA) (HCC): Status: ACTIVE | Noted: 2020-01-13

## 2023-10-20 PROBLEM — I47.10 PAROXYSMAL SUPRAVENTRICULAR TACHYCARDIA (HCC): Status: ACTIVE | Noted: 2018-10-17

## 2023-10-20 PROBLEM — I71.21 THORACIC ASCENDING AORTIC ANEURYSM: Status: ACTIVE | Noted: 2018-10-17

## 2023-10-20 PROBLEM — Q23.1 BICUSPID AORTIC VALVE: Status: ACTIVE | Noted: 2018-11-28

## 2023-10-20 PROBLEM — I10 HYPERTENSION, BENIGN: Status: ACTIVE | Noted: 2018-10-17

## 2023-10-20 PROBLEM — Q23.1 BICUSPID AORTIC VALVE (HCC): Status: ACTIVE | Noted: 2018-11-28

## 2023-10-20 PROBLEM — I47.10 SVT (SUPRAVENTRICULAR TACHYCARDIA): Status: ACTIVE | Noted: 2020-01-13

## 2023-10-20 PROBLEM — Q23.81 BICUSPID AORTIC VALVE: Status: ACTIVE | Noted: 2018-11-28

## 2023-10-20 NOTE — PROGRESS NOTES
HPI:     Ramón Caldera is a 75year old male with a PMH of HTN, asthma, Raynaud's. Following for:  1. Elevated PSA  2. BPH/LUTS and incidentally noted to have > 1 L in bladder 10/2/23  - 0.8 mg flomax and proscar 10/2/23. Started flomax 2021 and not sure if this helps  - on TID CIC since 10/2/23. 3. Kidney stones  - possibly passed one in 2021, no procedures  4. Worsening overflow urinary incontinence + stool incontinence when he has to urinate and during runs    HAILEE Mclean  LOV 10/2/2023    Presents for check-up with PVR. He currently feels much better since starting CIC. Appetite and energy are good. He is taking 0.8 mg flomax and proscar. He is performing 3 times daily CIC. Usually not post-void CIC with 500 - > 1000 mL out with caths. He is not voiding during the day. Wondering if running caused his urinary and bowel issues. AUA SS was 17/35 with 4 n, u, f; 2 w; 1 s, I, WILLIAM. Mixed feelings towards LUTS. Urinary incontinence: only when he has > 1000 mL in bladder. Prior to CIC was reporting yes, urge (and probably overflow incontinence) and changing underwear 2-3 x per day  Stool incontinence: once a day with a full bladder  Abd exam LOV: large, palpable bladder on exam  Penoscrotal LOV: no abnormalities  TISHA LOV: > 50 g prostate, no nodules or tenderness     UA is negative and PVR is 297 mL (was > 1 L) and catheterized at 9 AM.     Moderate ED. Currently masturbates without issue. Prior PSAs:  - 4.43 6/28/23  - 3.99 7/5/22  - 3.37 9/27/21  - 3.60 9/24/20  - 3.72 9/23/19  - 3.35 10/12/18  - < 3 y prior     UTI hx: none  Gross hematuria: none  Tobacco hx: none  Fam h/o  malignancy: none     CT AP 1/24/21: 1 mm distal left ureteral calc, BPH with marked bladder distention     Reported no water, 20 oz baca, 20 milk, 36 soda with clear urine. Now 40 oz water, 20 baca, 20 milk 40 soda with medium yellow urine. Discussed options for persistent retention.  He will increase CIC frequency to keep values < 600 mL, continue to drink plenty of water for UTI prevention. Continue tamsulosin and finasteride for BPH. Discussed options for persistent urinary and fecal incontinence. He is interested in starting pelvic floor physical therapy. Discussed options for persistent retention. Unclear if related completely to BPH or could also have a component of neurogenic bladder. Recommend UDS and office cysto for further evaluation and discussed rationale for both tests. He is amenable to both. I am also concerned that he is retaining so much and recommend checking CT SP to ensure no upper tract pathology. Will continue flomax, start proscar. Increase CIC frequency to keep post-void values < 600 mL out. Increase water intake to ~ 40-60 oz per day or enough to keep urine clear for UTI prevention. Checking CT SP. Start PFT for fecal and urinary incontinence. Check UDS and office cysto for persistent retention. I spent over 40 minutes in consultation and coordination of this patient's care today including review of pertinent labs, imaging, records with > 50% of time spent counseling. HISTORY:  Past Medical History:   Diagnosis Date    Aortic aneurysm (HCC)     Asthma     Bicuspid aortic valve     Hearing loss     Raynaud's syndrome     Unspecified essential hypertension     Wears glasses       History reviewed. No pertinent surgical history.    Family History   Problem Relation Age of Onset    Heart Attack Father     Hypertension Father     Cancer Mother         Colon/Liver    Colon Cancer Mother     Breast Cancer Sister     Breast Cancer Sister     Cancer Other         family hx, Colon      Social History:   Social History     Socioeconomic History    Marital status:    Tobacco Use    Smoking status: Never    Smokeless tobacco: Never   Vaping Use    Vaping Use: Never used   Substance and Sexual Activity    Alcohol use: No    Drug use: No   Other Topics Concern    Caffeine Concern Yes Comment: pop 2-4 daily/ tea    Exercise Yes     Comment: 4-5 x a week         Medications (Active prior to today's visit):  Current Outpatient Medications   Medication Sig Dispense Refill    fluticasone-salmeterol (WIXELA INHUB) 250-50 MCG/ACT Inhalation Aerosol Powder, Breath Activated Inhale 1 puff into the lungs Q12H. 180 each 0    dilTIAZem HCl ER Beads 120 MG Oral Capsule SR 24 Hr Take 1 capsule (120 mg total) by mouth daily. tamsulosin 0.4 MG Oral Cap Take 2 capsules (0.8 mg total) by mouth every evening. 180 capsule 5    finasteride 5 MG Oral Tab Take 1 tablet (5 mg total) by mouth daily. 90 tablet 6    losartan Potassium 50 MG Oral Tab Take 1 tablet (50 mg total) by mouth daily. Albuterol Sulfate  (90 Base) MCG/ACT Inhalation Aero Soln Inhale 2 puffs into the lungs every 6 (six) hours as needed for Wheezing or Shortness of Breath. (Patient not taking: Reported on 10/30/2023) 1 Inhaler 2       Allergies:  No Known Allergies      ROS:     A comprehensive 10 point review of systems was completed. Pertinent positives and negatives noted in the the HPI.     PHYSICAL EXAM:     GENERAL APPEARANCE: well, developed, well nourished, in no acute distress  NEUROLOGIC: nonfocal, alert and oriented  HEAD: normocephalic, atraumatic  EYES: sclera non-icteric  EARS: hearing intact  ORAL CAVITY: mucosa moist  NECK/THYROID: no obvious goiter or masses  LUNGS: nonlabored breathing  ABDOMEN: soft, no obvious masses or tenderness  SKIN: no obvious rashes    : as noted above    ASSESSMENT/PLAN:   Diagnoses and all orders for this visit:    BPH with obstruction/lower urinary tract symptoms    Recurrent kidney stones    Elevated PSA    Urinary retention    Incontinence of feces, unspecified fecal incontinence type  -     PELVIC FLOOR THERAPY - INTERNAL    Overflow incontinence  -     PELVIC FLOOR THERAPY - INTERNAL    Asymptomatic microscopic hematuria  -     CT ABDOMEN+PELVIS KIDNEYSTONE 2D RNDR(NO IV,NO ORAL)(CPT=74176); Future        - as noted above. Thanks again for this consult.     German Garcia MD, [facility]  Urologist  [facility]  Office: [phone]

## 2023-10-30 ENCOUNTER — OFFICE VISIT (OUTPATIENT)
Dept: SURGERY | Facility: CLINIC | Age: 68
End: 2023-10-30

## 2023-10-30 DIAGNOSIS — N20.0 RECURRENT KIDNEY STONES: ICD-10-CM

## 2023-10-30 DIAGNOSIS — R97.20 ELEVATED PSA: ICD-10-CM

## 2023-10-30 DIAGNOSIS — R31.21 ASYMPTOMATIC MICROSCOPIC HEMATURIA: ICD-10-CM

## 2023-10-30 DIAGNOSIS — N40.1 BPH WITH OBSTRUCTION/LOWER URINARY TRACT SYMPTOMS: Primary | ICD-10-CM

## 2023-10-30 DIAGNOSIS — R15.9 INCONTINENCE OF FECES, UNSPECIFIED FECAL INCONTINENCE TYPE: ICD-10-CM

## 2023-10-30 DIAGNOSIS — N39.490 OVERFLOW INCONTINENCE: ICD-10-CM

## 2023-10-30 DIAGNOSIS — R33.9 URINARY RETENTION: ICD-10-CM

## 2023-10-30 DIAGNOSIS — N13.8 BPH WITH OBSTRUCTION/LOWER URINARY TRACT SYMPTOMS: Primary | ICD-10-CM

## 2023-10-30 PROCEDURE — 1160F RVW MEDS BY RX/DR IN RCRD: CPT | Performed by: UROLOGY

## 2023-10-30 PROCEDURE — 1159F MED LIST DOCD IN RCRD: CPT | Performed by: UROLOGY

## 2023-10-30 PROCEDURE — 99215 OFFICE O/P EST HI 40 MIN: CPT | Performed by: UROLOGY

## 2023-10-30 NOTE — PATIENT INSTRUCTIONS
1. Increase water intake to 40-60 ounces (2 liters) per day or enough to keep urine clear to pale yellow. 2. Continue tamsulosin, finasteride  3. Increase post-void CIC frequency to keep values < 600 mL out  4. Start pelvic floor physical therapy  5. Plan for UDS and office cystoscopy.   6. Check CT

## 2023-11-02 ENCOUNTER — TELEPHONE (OUTPATIENT)
Dept: SURGERY | Facility: CLINIC | Age: 68
End: 2023-11-02

## 2023-11-22 ENCOUNTER — PATIENT MESSAGE (OUTPATIENT)
Dept: SURGERY | Facility: CLINIC | Age: 68
End: 2023-11-22

## 2023-11-28 ENCOUNTER — HOSPITAL ENCOUNTER (OUTPATIENT)
Dept: CT IMAGING | Facility: HOSPITAL | Age: 68
Discharge: HOME | End: 2023-11-28
Attending: UROLOGY
Payer: MEDICARE

## 2023-11-28 DIAGNOSIS — R31.21 ASYMPTOMATIC MICROSCOPIC HEMATURIA: ICD-10-CM

## 2023-11-28 PROCEDURE — 74176 CT ABD & PELVIS W/O CONTRAST: CPT | Performed by: UROLOGY

## 2023-11-28 NOTE — PROGRESS NOTES
HPI:     Kalani Fajardo is a 70year old male with a PMH of HTN, asthma, Raynaud's. Following for:  1. Elevated PSA  2. BPH/LUTS and incidentally noted to have > 1 L in bladder 10/2/23  - 0.8 mg flomax and proscar 10/2/23. Started flomax 2021 and not sure if this helps  - on 3 now 4 x per day CIC since 10/2/23  3. Kidney stones  - possibly passed one in 2021, no procedures  4. Worsening overflow urinary incontinence + stool incontinence when he has to urinate and during runs    PCP Mamie Mclean  LOV 10/30/2023    Presents for check-up, review CT, UDS, office cysto, discuss next steps. He feels much better since starting CIC. Appetite and energy are good. He is taking 0.8 mg flomax and proscar. He is performing 3-4 times daily CIC with 400-800 mL out with caths. He is not voiding during the day. He has not started PFT for h/o incontinence. AUA SS was 17/35 prior to CIC with 4 n, u, f; 2 w; 1 s, I, WILLIAM. Mixed feelings towards LUTS. Urinary incontinence: only when he has > 1000 mL in bladder. Prior to CIC was reporting yes, urge (and probably overflow incontinence) and changing underwear 2-3 x per day  Stool incontinence: once a day with a full bladder  Abd exam LOV: large, palpable bladder on exam  Penoscrotal LOV: no abnormalities  TISHA LOV: > 50 g prostate, no nodules or tenderness     UA is negative and PVR was 297 mL LOV after catherizing a few hours prior. Was > 1 L during initial visit. Moderate ED. Currently masturbates without issue. Prior PSAs:  - 4.43 6/28/23  - 3.99 7/5/22  - 3.37 9/27/21  - 3.60 9/24/20  - 3.72 9/23/19  - 3.35 10/12/18  - < 3 y prior     UTI hx: none  Gross hematuria: none  Tobacco hx: none  Fam h/o  malignancy: none    Reported no water, 20 oz baca, 20 milk, 36 soda with clear urine. Now 40 oz water, 20 baca, 20 milk 40 soda with medium yellow urine.  I encouraged the pt drink at least 40-60 oz water per day or enough to keep urine clear to pale yellow for UTI and stone prevention. CT SP 11/28/23: three large (10-16 mm) bladder stones, 3 RMP, BPH with BWT  CT AP 1/24/21: 1 mm distal left ureteral calc, BPH with marked bladder distention    UDS today: first sensation 32 mL, first desire 462 mL, strong desire 650 mL, max bladder capacity of 730 mL. No leakage with valsalva. The patient was not able to void with max detrusor pressure of 8 cm H2O but he didn't really try to void as he was worried about fecal incontinence and 813 mL PVR. Cysto today: mild-mod BPH with mod ARNOLDO. Three large (~ 1.5 cm) bladder stones. No other abnormalities    Discussed that UDS would indicate poor detrusor activity and poor sensation but patient apparently did not try to void as he was worried about leaking stool so we don't actually know if he has detrusor activity. Options would including bladder stone removal and continuing CIC indefinitely. Alternatively could proceed with bladder stone removal and concurrent TURP. We discussed the risks and benefits to both procedure sincluding, but not limited to, bleeding, infection, possible damage to surrounding structures. The patient understands and would like to proceed with bladder stones removal and TURP. He will continue flomax, proscar. Continue 3-4 x daily CIC. Recommend he start PFT given persistent issues with stool and urinary incontinence with a full bladder. OR for TURP with bladder stones removal. Consider bethanechol later if stones don't improve. I spent over 40 minutes in consultation and coordination of this patient's care today including review of pertinent labs, imaging, records with > 50% of time spent counseling - not including time spent performing cystoscopy. PROCEDURE NOTE    PROCEDURE PERFORMED: Flexible Cystoscopy    After informed consent and urinalysis was obtained, he was placed in the supine position and prepped and draped in the usual sterile fashion using Betadine.  Local anesthesia was induced by the introduction of 2% Lidocaine jelly per urethra. A 16 Italian flexible scope was passed through the anterior urethra, the posterior urethra and prostate were negotiated and the bladder was entered. The entirety of the bladder was examined and the scope was retroflexed to examine the bladder neck. Findings: as noted above    The patient tolerated the procedure well, suffered no complications, was able to void spontaneously after completion of the procedure in the office, and left the office in good condition. Hanane-procedural antibiotics were given.  _________________________________      Past Medical History:   Diagnosis Date    Aortic aneurysm (HCC)     Asthma     Bicuspid aortic valve     Hearing loss     Raynaud's syndrome     Unspecified essential hypertension     Wears glasses       History reviewed. No pertinent surgical history. Family History   Problem Relation Age of Onset    Heart Attack Father     Hypertension Father     Cancer Mother         Colon/Liver    Colon Cancer Mother     Breast Cancer Sister     Breast Cancer Sister     Cancer Other         family hx, Colon      Social History:   Social History     Socioeconomic History    Marital status:    Tobacco Use    Smoking status: Never    Smokeless tobacco: Never   Vaping Use    Vaping Use: Never used   Substance and Sexual Activity    Alcohol use: No    Drug use: No   Other Topics Concern    Caffeine Concern Yes     Comment: pop 2-4 daily/ tea    Exercise Yes     Comment: 4-5 x a week         Medications (Active prior to today's visit):  Current Outpatient Medications   Medication Sig Dispense Refill    fluticasone-salmeterol (WIXELA INHUB) 250-50 MCG/ACT Inhalation Aerosol Powder, Breath Activated Inhale 1 puff into the lungs Q12H. 180 each 0    dilTIAZem HCl ER Beads 120 MG Oral Capsule SR 24 Hr Take 1 capsule (120 mg total) by mouth daily. tamsulosin 0.4 MG Oral Cap Take 2 capsules (0.8 mg total) by mouth every evening.  [address] [address] capsule 5    finasteride 5 MG Oral Tab Take 1 tablet (5 mg total) by mouth daily. 90 tablet 6    losartan Potassium 50 MG Oral Tab Take 1 tablet (50 mg total) by mouth daily. Albuterol Sulfate  (90 Base) MCG/ACT Inhalation Aero Soln Inhale 2 puffs into the lungs every 6 (six) hours as needed for Wheezing or Shortness of Breath. 1 Inhaler 2       Allergies:  No Known Allergies      ROS:     A comprehensive 10 point review of systems was completed. Pertinent positives and negatives noted in the the HPI. PHYSICAL EXAM:     GENERAL APPEARANCE: well, developed, well nourished, in no acute distress  NEUROLOGIC: nonfocal, alert and oriented  HEAD: normocephalic, atraumatic  EYES: sclera non-icteric  EARS: hearing intact  ORAL CAVITY: mucosa moist  NECK/THYROID: no obvious goiter or masses  LUNGS: nonlabored breathing  ABDOMEN: soft, no obvious masses or tenderness  SKIN: no obvious rashes    : as noted above    ASSESSMENT/PLAN:   Diagnoses and all orders for this visit:    BPH with obstruction/lower urinary tract symptoms    Recurrent kidney stones    Incontinence of feces, unspecified fecal incontinence type  -     PELVIC FLOOR THERAPY - INTERNAL    Overflow incontinence    Elevated PSA    Urinary retention  -     CYSTOURETHROSCOPY  -     CYSTOMETROGRAM W/&UP  -     INTRAABDOMINAL PRESSURE TEST  -     ANAL/URINARY MUSCLE STUDY      - as noted above. Thanks again for this consult.     Adeel Green MD, [place] [address]  Urologist  [facility]  Office: [phone]

## 2023-11-29 NOTE — TELEPHONE ENCOUNTER
From: Jimena Jones  To: Adeel Green  Sent: 11/22/2023 12:18 PM CST  Subject: Increase in monthly catheter quantity     I have requested an increase in the quantity of my monthly supply of catheters from 90 to 120. [facility] has informed me that they cannot do this without authorization from the doctor. Can someone send an updated prescription to [facility] to increase my monthly catheter supply to a quantity of 120? Dr. Mini Angulo is aware of the fact that I was going to increase the daily use of the catheters.

## 2023-11-29 NOTE — IMAGING NOTE
Left message with call back number regarding  instructions given in preparation for gated study procedure:  Check-in in the [address] side out-patient registration at [place] [address]. Hold caffeine, decaf drink  and chocolate 12 hours prior. Take all prescribed medications as scheduled daily.   [facility]

## 2023-12-01 ENCOUNTER — HOSPITAL ENCOUNTER (OUTPATIENT)
Dept: CT IMAGING | Facility: HOSPITAL | Age: 68
Discharge: HOME | End: 2023-12-01
Attending: INTERNAL MEDICINE
Payer: MEDICARE

## 2023-12-01 VITALS — SYSTOLIC BLOOD PRESSURE: 111 MMHG | DIASTOLIC BLOOD PRESSURE: 73 MMHG | HEART RATE: 61 BPM

## 2023-12-01 DIAGNOSIS — Q23.1 BICUSPID AORTIC VALVE: ICD-10-CM

## 2023-12-01 DIAGNOSIS — I71.21 THORACIC ASCENDING AORTIC ANEURYSM (HCC): ICD-10-CM

## 2023-12-01 LAB
CREAT BLD-MCNC: 1 MG/DL
EGFRCR SERPLBLD CKD-EPI 2021: 81 ML/MIN/1.73M2 (ref 60–?)

## 2023-12-01 PROCEDURE — 82565 ASSAY OF CREATININE: CPT

## 2023-12-01 PROCEDURE — 71275 CT ANGIOGRAPHY CHEST: CPT | Performed by: INTERNAL MEDICINE

## 2023-12-01 NOTE — IMAGING NOTE
Dolores Birks to CT Rm 4 GE. Positioned pt on table. Procedure explained and questions answered. Vital signs monitored and noted in Flowsheet. GFR = 81  Contrast injected followed by saline flush at 0816  Contrast = 80ml  0.9 NS flush = 65ml  Average HR = 46    Patient tolerated the procedure without complication. Denies any contrast reaction. Discontinued IV saline lock. Escorted pt to Men's Dressing Room and discharged in stable condition.

## 2023-12-05 ENCOUNTER — PROCEDURE (OUTPATIENT)
Dept: SURGERY | Facility: CLINIC | Age: 68
End: 2023-12-05
Payer: MEDICARE

## 2023-12-05 ENCOUNTER — NURSE ONLY (OUTPATIENT)
Dept: SURGERY | Facility: CLINIC | Age: 68
End: 2023-12-05
Payer: MEDICARE

## 2023-12-05 ENCOUNTER — TELEPHONE (OUTPATIENT)
Dept: SURGERY | Facility: CLINIC | Age: 68
End: 2023-12-05

## 2023-12-05 DIAGNOSIS — R97.20 ELEVATED PSA: ICD-10-CM

## 2023-12-05 DIAGNOSIS — N13.8 BPH WITH OBSTRUCTION/LOWER URINARY TRACT SYMPTOMS: Primary | ICD-10-CM

## 2023-12-05 DIAGNOSIS — R33.9 URINARY RETENTION: ICD-10-CM

## 2023-12-05 DIAGNOSIS — N40.1 BPH WITH OBSTRUCTION/LOWER URINARY TRACT SYMPTOMS: Primary | ICD-10-CM

## 2023-12-05 DIAGNOSIS — N20.0 RECURRENT KIDNEY STONES: ICD-10-CM

## 2023-12-05 DIAGNOSIS — R82.90 URINE FINDING: Primary | ICD-10-CM

## 2023-12-05 DIAGNOSIS — R15.9 INCONTINENCE OF FECES, UNSPECIFIED FECAL INCONTINENCE TYPE: ICD-10-CM

## 2023-12-05 DIAGNOSIS — N39.490 OVERFLOW INCONTINENCE: ICD-10-CM

## 2023-12-05 PROCEDURE — 52000 CYSTOURETHROSCOPY: CPT | Performed by: UROLOGY

## 2023-12-05 PROCEDURE — 1159F MED LIST DOCD IN RCRD: CPT | Performed by: UROLOGY

## 2023-12-05 PROCEDURE — 51729 CYSTOMETROGRAM W/VP&UP: CPT | Performed by: UROLOGY

## 2023-12-05 PROCEDURE — 99215 OFFICE O/P EST HI 40 MIN: CPT | Performed by: UROLOGY

## 2023-12-05 PROCEDURE — 51797 INTRAABDOMINAL PRESSURE TEST: CPT | Performed by: UROLOGY

## 2023-12-05 PROCEDURE — 51784 ANAL/URINARY MUSCLE STUDY: CPT | Performed by: UROLOGY

## 2023-12-05 PROCEDURE — 1160F RVW MEDS BY RX/DR IN RCRD: CPT | Performed by: UROLOGY

## 2023-12-05 RX ORDER — CIPROFLOXACIN 500 MG/1
500 TABLET, FILM COATED ORAL ONCE
Status: SHIPPED | OUTPATIENT
Start: 2023-12-05

## 2023-12-05 NOTE — TELEPHONE ENCOUNTER
Please schedule this patient for surgery.     Thanks,    Ayaka Castelan    Urology Surgery Request  Surgeon: Luis Huerta (if known): EDW  Procedure: cysto, TURP, Holmium litho and removal of bladder stones   Anesthesia: General   Time Frame: pt preference, not urgent  Time required: 80 minutes  Diagnosis: BPH with retention, bladder stones    Antibiotics: per hospital protocol unless checked below   _x_ Levaquin 500 mg IV   ___ Gemcitabine 2 g/100 mL NS bladder instillation to be given in OR    Estimated Post Op/Follow Up Appt: ~ 8-10 days for voiding trial with PA, ~ 4 mo with me with PVR normal...

## 2023-12-06 ENCOUNTER — PATIENT MESSAGE (OUTPATIENT)
Dept: SURGERY | Facility: CLINIC | Age: 68
End: 2023-12-06

## 2023-12-08 ENCOUNTER — TELEPHONE (OUTPATIENT)
Dept: SURGERY | Facility: CLINIC | Age: 68
End: 2023-12-08

## 2023-12-08 NOTE — TELEPHONE ENCOUNTER
----- Message from Guicho Short sent at 12/6/2023 12:14 PM CST -----  Regarding: TURP Procedure  Contact: [phone]  I hate to be a pest because I know you're a busy person. If you were in my shoes, would you get the TURP procedure? Does it make any sense to wait awhile to see if the medications I'm currently taking and PFT will help my situation?   Thanks,  Luiz Roque

## 2023-12-11 NOTE — TELEPHONE ENCOUNTER
From my understanding he did not attempt to void during UDS which patients always should try to do. We could get him back in for another UDS prior to making any decisions if he'd like. I opened up clinic next Tuesday so I can see him then with UDS prior. Alternatively if he just wants to come in to discuss without repeating UDS I'm OK with that too. I would suggest at least another visit with me prior to making any surgical plans as he still has some questions and wants to make sure he feels comfortable with plan.

## 2023-12-12 NOTE — TELEPHONE ENCOUNTER
From: Katie Taylor  To: Linus Joshua  Sent: 12/6/2023 12:14 PM CST  Subject: TURP Procedure    I hate to be a pest because I know you're a busy person. If you were in my shoes, would you get the TURP procedure? Does it make any sense to wait awhile to see if the medications I'm currently taking and PFT will help my situation?   Thanks,  Dariana Scott

## 2023-12-13 NOTE — TELEPHONE ENCOUNTER
Called patient and left message to schedule next procedure on the below dates. Will ask Elvie Jameson to mail letter tomorrow. 12/8/23 x2  12/11/23 12/12/23.

## 2023-12-14 NOTE — TELEPHONE ENCOUNTER
Received a printed surgery ticket from STEVE Balderas requesting a letter be sent to the patient. Was originally thinking a Pre-op letter, but doesn't appear that the patient and surgery scheduler have made contact to schedule the surgery but LVM on 12/8, 12/11 & 12/12/23 Will send a letter requesting that the patient contact Isai Rubin to schedule the surgery and note dates of VM left. See Original ticket on 12/5/23.

## 2023-12-18 NOTE — PROGRESS NOTES
Virtual Video Check-In    García Anderson verbally consents to a Virtual Video Check-In service today. This is a telemedicine visit with live, interactive video and audio. Patient understands and accepts financial responsibility for any deductible, co-insurance and/or co-pays associated with this service. Duration of the service including review of pertinent imaging/labs and coordination of care: 43 minutes    Summary of topics discussed:  See below    HPI:     García Anderson is a 70year old male with a PMH of HTN, asthma, Raynaud's. Following for:  1. Elevated PSA  2. BPH/LUTS and incidentally noted to have > 1 L in bladder 10/2/23  - 0.8 mg flomax and proscar 10/2/23. Started flomax 2021 and not sure if this helps  - on 3 now 4 x per day CIC since 10/2/23  - Cysto 12/5/23: mild-mod BPH with mod ARNOLDO. Three large (~ 1.5 cm) bladder stones. No other abnormalities  3. Kidney stones  - possibly passed one in 2021, no procedures  4. Retention with urinary and fecal incontinence when he has to urinate and during runs  - UDS 12/5/23: first sensation 32 mL, first desire 462 mL, strong desire 650 mL, max bladder capacity of 730 mL. No leakage with valsalva. The patient was not able to void with max detrusor pressure of 8 cm H2O but he didn't really try to void as he was worried about fecal incontinence and 813 mL PVR. PCP - Solitario PALACIOS 12/5/2023    Presents for check-up, review options again as he has questions. He is performing 3 times daily CIC with 300-900 mL out with caths. He is not voiding during the day. He feels much better since starting CIC. Appetite and energy are good. He is taking 0.8 mg flomax and proscar. He has not started PFT for h/o incontinence. Discussed the importance of this and he is amenable. He may also see GI to discuss. AUA SS was 17/35 prior to CIC with 4 n, u, f; 2 w; 1 s, I, WILLIAM. Mixed feelings towards LUTS.   Urinary incontinence: only when he has > 1000 mL in bladder. Prior to CIC was reporting yes, urge (and probably overflow incontinence) and changing underwear 2-3 x per day  Stool incontinence: maybe once a day with a full bladder  Abd exam LOV: large, palpable bladder on exam  Penoscrotal LOV: no abnormalities  TISHA LOV: > 50 g prostate, no nodules or tenderness     UA is negative and PVR was 297 mL LOV after catherizing a few hours prior. Was > 1 L during initial visit. Moderate ED. Currently masturbates without issue. Prior PSAs:  - 4.43 6/28/23  - 3.99 7/5/22  - 3.37 9/27/21  - 3.60 9/24/20  - 3.72 9/23/19  - 3.35 10/12/18  - < 3 y prior     UTI hx: none  Gross hematuria: none  Tobacco hx: none  Fam h/o  malignancy: none    Reported no water, 20 oz baca, 20 milk, 36 soda with clear urine. Now 40 oz water, 20 baca, 20 milk 40 soda with medium yellow urine. I encouraged the pt drink at least 40-60 oz water per day or enough to keep urine clear to pale yellow for UTI and stone prevention. CT SP 11/28/23: three large (10-16 mm) bladder stones, 3 RMP, BPH with BWT  CT AP 1/24/21: 1 mm distal left ureteral calc, BPH with marked bladder distention     Discussed that UDS would indicate poor detrusor activity and poor sensation but patient apparently did not try to void as he was worried about leaking stool so we don't actually know if he has detrusor activity. Discussed we could repeat UDS and have him try to void if he wants more info prior to making a decision. Discussed TURP risks and benefits and possible SEs as well as typical recovery course. We also discussed his PSA was also elevated so would be good idea to sample prostate either with biopsy or we would be doing this during TURP anyways. We discussed option to try bethanechol to see if this helps with bladder contractility. We reviewed rationale and SEs to this.     We discussed that we should definitely treat the stones as these will serve as nidus for infection/hematuria and may obstruct the urethra. He would have option to proceed with TURP at the same time as stone removal.    After discussing all these options in detail he wants to continue flomax, proscar for BPH. Increase CIC frequency to 4-5 x daily CIC and titrate CIC to keep values < 500 mL. He will call to schedule PFT for urinary and bowel incontinence and will consider seeing GI as well for bowel incontinence. Starting bethanechol to see if this helps with bladder contractility. OR for TURP with bladder stones removal.     I spent over 40 minutes in consultation and coordination of this patient's care today including review of pertinent labs, imaging, records with > 50% of time spent counseling - not including time spent performing cystoscopy. Past Medical History:   Diagnosis Date    Aortic aneurysm (HCC)     Asthma     Bicuspid aortic valve     Hearing loss     Raynaud's syndrome     Unspecified essential hypertension     Wears glasses       No past surgical history on file. Family History   Problem Relation Age of Onset    Heart Attack Father     Hypertension Father     Cancer Mother         Colon/Liver    Colon Cancer Mother     Breast Cancer Sister     Breast Cancer Sister     Cancer Other         family hx, Colon      Social History:   Social History     Socioeconomic History    Marital status:    Tobacco Use    Smoking status: Never    Smokeless tobacco: Never   Vaping Use    Vaping Use: Never used   Substance and Sexual Activity    Alcohol use: No    Drug use: No   Other Topics Concern    Caffeine Concern Yes     Comment: pop 2-4 daily/ tea    Exercise Yes     Comment: 4-5 x a week         Medications (Active prior to today's visit):  Current Outpatient Medications   Medication Sig Dispense Refill    Bethanechol Chloride 50 MG Oral Tab Take 2 tablets (100 mg total) by mouth See Admin Instructions.  First week: take 1 tablet daily Second week: 1 tablet every 12 hours Third week and beyond: 2 tablets every 12 hours If you get side effects from increased dose go back to lower dose. 120 tablet 5    fluticasone-salmeterol (WIXELA INHUB) 250-50 MCG/ACT Inhalation Aerosol Powder, Breath Activated Inhale 1 puff into the lungs Q12H. 180 each 0    dilTIAZem HCl ER Beads 120 MG Oral Capsule SR 24 Hr Take 1 capsule (120 mg total) by mouth daily. tamsulosin 0.4 MG Oral Cap Take 2 capsules (0.8 mg total) by mouth every evening. 180 capsule 5    finasteride 5 MG Oral Tab Take 1 tablet (5 mg total) by mouth daily. 90 tablet 6    losartan Potassium 50 MG Oral Tab Take 1 tablet (50 mg total) by mouth daily. Albuterol Sulfate  (90 Base) MCG/ACT Inhalation Aero Soln Inhale 2 puffs into the lungs every 6 (six) hours as needed for Wheezing or Shortness of Breath. 1 Inhaler 2       Allergies:  No Known Allergies      ROS:     A comprehensive 10 point review of systems was completed. Pertinent positives and negatives noted in the the HPI. PHYSICAL EXAM:     GENERAL APPEARANCE: well, developed, well nourished, in no acute distress  NEUROLOGIC: nonfocal, alert and oriented  HEAD: normocephalic, atraumatic  EYES: sclera non-icteric  EARS: hearing intact  ORAL CAVITY: mucosa moist  NECK/THYROID: no obvious goiter or masses  LUNGS: nonlabored breathing  ABDOMEN: soft, no obvious masses or tenderness  SKIN: no obvious rashes    : as noted above    ASSESSMENT/PLAN:   Diagnoses and all orders for this visit:    BPH with obstruction/lower urinary tract symptoms    Overflow incontinence    Recurrent kidney stones    Incontinence of feces, unspecified fecal incontinence type    Elevated PSA    Urinary retention    Acontractile bladder  -     Bethanechol Chloride 50 MG Oral Tab; Take 2 tablets (100 mg total) by mouth See Admin Instructions. First week: take 1 tablet daily Second week: 1 tablet every 12 hours Third week and beyond: 2 tablets every 12 hours If you get side effects from increased dose go back to lower dose.     - as noted above. Thanks again for this consult.     Maia Greenwood MD, [facility]  Urologist  [place] [id number]  Office: [phone]

## 2023-12-19 ENCOUNTER — TELEMEDICINE (OUTPATIENT)
Dept: SURGERY | Facility: CLINIC | Age: 68
End: 2023-12-19
Payer: MEDICARE

## 2023-12-19 ENCOUNTER — TELEPHONE (OUTPATIENT)
Dept: SURGERY | Facility: CLINIC | Age: 68
End: 2023-12-19

## 2023-12-19 DIAGNOSIS — R15.9 INCONTINENCE OF FECES, UNSPECIFIED FECAL INCONTINENCE TYPE: ICD-10-CM

## 2023-12-19 DIAGNOSIS — N31.9 ACONTRACTILE BLADDER: ICD-10-CM

## 2023-12-19 DIAGNOSIS — N13.8 BPH WITH OBSTRUCTION/LOWER URINARY TRACT SYMPTOMS: Primary | ICD-10-CM

## 2023-12-19 DIAGNOSIS — N40.1 BPH WITH OBSTRUCTION/LOWER URINARY TRACT SYMPTOMS: Primary | ICD-10-CM

## 2023-12-19 DIAGNOSIS — R33.8 BENIGN PROSTATIC HYPERPLASIA WITH URINARY RETENTION: Primary | ICD-10-CM

## 2023-12-19 DIAGNOSIS — N21.0 BLADDER STONES: ICD-10-CM

## 2023-12-19 DIAGNOSIS — R33.9 URINARY RETENTION: ICD-10-CM

## 2023-12-19 DIAGNOSIS — N20.0 RECURRENT KIDNEY STONES: ICD-10-CM

## 2023-12-19 DIAGNOSIS — R97.20 ELEVATED PSA: ICD-10-CM

## 2023-12-19 DIAGNOSIS — N39.490 OVERFLOW INCONTINENCE: ICD-10-CM

## 2023-12-19 DIAGNOSIS — N40.1 BENIGN PROSTATIC HYPERPLASIA WITH URINARY RETENTION: Primary | ICD-10-CM

## 2023-12-19 PROCEDURE — 1160F RVW MEDS BY RX/DR IN RCRD: CPT | Performed by: UROLOGY

## 2023-12-19 PROCEDURE — 1159F MED LIST DOCD IN RCRD: CPT | Performed by: UROLOGY

## 2023-12-19 PROCEDURE — 99215 OFFICE O/P EST HI 40 MIN: CPT | Performed by: UROLOGY

## 2023-12-19 RX ORDER — BETHANECHOL CHLORIDE 50 MG/1
100 TABLET ORAL SEE ADMIN INSTRUCTIONS
Qty: 120 TABLET | Refills: 5 | Status: SHIPPED | OUTPATIENT
Start: 2023-12-19

## 2023-12-19 NOTE — TELEPHONE ENCOUNTER
Please schedule this patient for surgery. I sent this ticket before but patient had additional questions for me. He is now ready to schedule.      Thanks,     Kathy Tridell     Urology Surgery Request  Surgeon: Jean Bacon (if known): EDW  Procedure: cysto, TURP, Holmium litho and removal of bladder stones   Anesthesia: General   Time Frame: pt preference, not urgent  Time required: 80 minutes  Diagnosis: BPH with retention, bladder stones     Antibiotics: per hospital protocol unless checked below               _x_ Levaquin 500 mg IV               ___ Gemcitabine 2 g/100 mL NS bladder instillation to be given in OR     Estimated Post Op/Follow Up Appt: ~ 8-10 days for voiding trial with PA, ~ 4 mo with me with PVR

## 2023-12-22 NOTE — TELEPHONE ENCOUNTER
Spoke with the patient. Scheduling the surgery for  2/22/24. I also scheduled a 8-10 day  f/u with PA on 3/4/24 @10:00 am. Also 4 mo f/u rajwinder/MD 6/24/24 @ 12pm   Reviewed the pre-op instructions and will send via My Chart or mail to patient once confirmed.   Patient can contact me at [phone]

## 2024-01-15 DIAGNOSIS — J45.30 MILD PERSISTENT ASTHMA WITHOUT COMPLICATION: ICD-10-CM

## 2024-01-16 RX ORDER — FLUTICASONE PROPIONATE AND SALMETEROL 250; 50 UG/1; UG/1
1 POWDER RESPIRATORY (INHALATION) EVERY 12 HOURS
Qty: 180 EACH | Refills: 0 | Status: SHIPPED | OUTPATIENT
Start: 2024-01-16

## 2024-01-27 ENCOUNTER — PATIENT MESSAGE (OUTPATIENT)
Dept: SURGERY | Facility: CLINIC | Age: 69
End: 2024-01-27

## 2024-01-29 NOTE — TELEPHONE ENCOUNTER
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 I am having the TURP procedure done February 22nd. It was my understanding verbally from Dr. Roy that I would not be able to do physically stressful activities for two weeks after this procedure. Could someone provide a note for [employer] management verifying this and when I would be able to return to the full physical capability this job requires? Or, when I can return for 'light duty' work until I am approved for full physical activity?  Thanks,  Kaden Osborne

## 2024-02-05 ENCOUNTER — LABORATORY ENCOUNTER (OUTPATIENT)
Dept: LAB | Age: 69
End: 2024-02-05
Attending: UROLOGY
Payer: MEDICARE

## 2024-02-05 ENCOUNTER — EKG ENCOUNTER (OUTPATIENT)
Dept: LAB | Age: 69
End: 2024-02-05
Attending: UROLOGY
Payer: MEDICARE

## 2024-02-05 DIAGNOSIS — Z01.818 PRE-OP TESTING: ICD-10-CM

## 2024-02-05 LAB
ANION GAP SERPL CALC-SCNC: 2 MMOL/L (ref 0–18)
ATRIAL RATE: 67 BPM
BUN BLD-MCNC: 15 MG/DL (ref 9–23)
CALCIUM BLD-MCNC: 9.9 MG/DL (ref 8.5–10.1)
CHLORIDE SERPL-SCNC: 110 MMOL/L (ref 98–112)
CO2 SERPL-SCNC: 30 MMOL/L (ref 21–32)
CREAT BLD-MCNC: 1.05 MG/DL
EGFRCR SERPLBLD CKD-EPI 2021: 77 ML/MIN/1.73M2 (ref 60–?)
FASTING STATUS PATIENT QL REPORTED: YES
GLUCOSE BLD-MCNC: 84 MG/DL (ref 70–99)
OSMOLALITY SERPL CALC.SUM OF ELEC: 294 MOSM/KG (ref 275–295)
P AXIS: 92 DEGREES
P-R INTERVAL: 178 MS
POTASSIUM SERPL-SCNC: 4.1 MMOL/L (ref 3.5–5.1)
Q-T INTERVAL: 420 MS
QRS DURATION: 82 MS
QTC CALCULATION (BEZET): 443 MS
R AXIS: -65 DEGREES
SODIUM SERPL-SCNC: 142 MMOL/L (ref 136–145)
T AXIS: 64 DEGREES
VENTRICULAR RATE: 67 BPM

## 2024-02-05 PROCEDURE — 80048 BASIC METABOLIC PNL TOTAL CA: CPT

## 2024-02-05 PROCEDURE — 93005 ELECTROCARDIOGRAM TRACING: CPT

## 2024-02-05 PROCEDURE — 93010 ELECTROCARDIOGRAM REPORT: CPT | Performed by: INTERNAL MEDICINE

## 2024-02-22 ENCOUNTER — HOSPITAL ENCOUNTER (OUTPATIENT)
Facility: HOSPITAL | Age: 69
Discharge: HOME | End: 2024-02-23
Attending: UROLOGY | Admitting: UROLOGY
Payer: MEDICARE

## 2024-02-22 ENCOUNTER — APPOINTMENT (OUTPATIENT)
Dept: GENERAL RADIOLOGY | Facility: HOSPITAL | Age: 69
End: 2024-02-22
Attending: UROLOGY
Payer: MEDICARE

## 2024-02-22 ENCOUNTER — ANESTHESIA EVENT (OUTPATIENT)
Dept: SURGERY | Facility: HOSPITAL | Age: 69
End: 2024-02-22
Payer: MEDICARE

## 2024-02-22 ENCOUNTER — ANESTHESIA (OUTPATIENT)
Dept: SURGERY | Facility: HOSPITAL | Age: 69
End: 2024-02-22
Payer: MEDICARE

## 2024-02-22 DIAGNOSIS — R33.8 BENIGN PROSTATIC HYPERPLASIA WITH URINARY RETENTION: ICD-10-CM

## 2024-02-22 DIAGNOSIS — N40.1 BENIGN PROSTATIC HYPERPLASIA WITH URINARY RETENTION: ICD-10-CM

## 2024-02-22 DIAGNOSIS — N21.0 BLADDER STONES: ICD-10-CM

## 2024-02-22 DIAGNOSIS — Z01.818 PRE-OP TESTING: Primary | ICD-10-CM

## 2024-02-22 PROCEDURE — 0TCB8ZZ EXTIRPATION OF MATTER FROM BLADDER, VIA NATURAL OR ARTIFICIAL OPENING ENDOSCOPIC: ICD-10-PCS | Performed by: UROLOGY

## 2024-02-22 PROCEDURE — 0VT08ZZ RESECTION OF PROSTATE, VIA NATURAL OR ARTIFICIAL OPENING ENDOSCOPIC: ICD-10-PCS | Performed by: UROLOGY

## 2024-02-22 PROCEDURE — 52318 REMOVE BLADDER STONE: CPT | Performed by: UROLOGY

## 2024-02-22 PROCEDURE — 52601 PROSTATECTOMY (TURP): CPT | Performed by: UROLOGY

## 2024-02-22 RX ORDER — LEVOFLOXACIN 5 MG/ML
500 INJECTION, SOLUTION INTRAVENOUS DAILY
Status: DISCONTINUED | OUTPATIENT
Start: 2024-02-23 | End: 2024-02-23

## 2024-02-22 RX ORDER — LEVOFLOXACIN 5 MG/ML
500 INJECTION, SOLUTION INTRAVENOUS ONCE
Status: COMPLETED | OUTPATIENT
Start: 2024-02-22 | End: 2024-02-22

## 2024-02-22 RX ORDER — TAMSULOSIN HYDROCHLORIDE 0.4 MG/1
0.8 CAPSULE ORAL EVERY EVENING
Status: DISCONTINUED | OUTPATIENT
Start: 2024-02-22 | End: 2024-02-23

## 2024-02-22 RX ORDER — LIDOCAINE HYDROCHLORIDE 10 MG/ML
INJECTION, SOLUTION EPIDURAL; INFILTRATION; INTRACAUDAL; PERINEURAL AS NEEDED
Status: DISCONTINUED | OUTPATIENT
Start: 2024-02-22 | End: 2024-02-22 | Stop reason: SURG

## 2024-02-22 RX ORDER — HYDROMORPHONE HYDROCHLORIDE 1 MG/ML
0.2 INJECTION, SOLUTION INTRAMUSCULAR; INTRAVENOUS; SUBCUTANEOUS EVERY 5 MIN PRN
Status: DISCONTINUED | OUTPATIENT
Start: 2024-02-22 | End: 2024-02-22 | Stop reason: HOSPADM

## 2024-02-22 RX ORDER — SODIUM CHLORIDE, SODIUM LACTATE, POTASSIUM CHLORIDE, CALCIUM CHLORIDE 600; 310; 30; 20 MG/100ML; MG/100ML; MG/100ML; MG/100ML
INJECTION, SOLUTION INTRAVENOUS CONTINUOUS
Status: DISCONTINUED | OUTPATIENT
Start: 2024-02-22 | End: 2024-02-22

## 2024-02-22 RX ORDER — HYDROCODONE BITARTRATE AND ACETAMINOPHEN 5; 325 MG/1; MG/1
2 TABLET ORAL ONCE AS NEEDED
Status: DISCONTINUED | OUTPATIENT
Start: 2024-02-22 | End: 2024-02-22 | Stop reason: HOSPADM

## 2024-02-22 RX ORDER — SODIUM CHLORIDE 9 MG/ML
INJECTION, SOLUTION INTRAVENOUS CONTINUOUS
Status: DISCONTINUED | OUTPATIENT
Start: 2024-02-22 | End: 2024-02-23

## 2024-02-22 RX ORDER — ONDANSETRON 2 MG/ML
4 INJECTION INTRAMUSCULAR; INTRAVENOUS EVERY 6 HOURS PRN
Status: DISCONTINUED | OUTPATIENT
Start: 2024-02-22 | End: 2024-02-22 | Stop reason: HOSPADM

## 2024-02-22 RX ORDER — DILTIAZEM HYDROCHLORIDE 120 MG/1
120 CAPSULE, EXTENDED RELEASE ORAL DAILY
Status: DISCONTINUED | OUTPATIENT
Start: 2024-02-23 | End: 2024-02-23

## 2024-02-22 RX ORDER — DEXAMETHASONE SODIUM PHOSPHATE 4 MG/ML
VIAL (ML) INJECTION AS NEEDED
Status: DISCONTINUED | OUTPATIENT
Start: 2024-02-22 | End: 2024-02-22 | Stop reason: SURG

## 2024-02-22 RX ORDER — GLYCOPYRROLATE 0.2 MG/ML
INJECTION, SOLUTION INTRAMUSCULAR; INTRAVENOUS AS NEEDED
Status: DISCONTINUED | OUTPATIENT
Start: 2024-02-22 | End: 2024-02-22 | Stop reason: SURG

## 2024-02-22 RX ORDER — HYDROMORPHONE HYDROCHLORIDE 1 MG/ML
0.4 INJECTION, SOLUTION INTRAMUSCULAR; INTRAVENOUS; SUBCUTANEOUS EVERY 5 MIN PRN
Status: DISCONTINUED | OUTPATIENT
Start: 2024-02-22 | End: 2024-02-22 | Stop reason: HOSPADM

## 2024-02-22 RX ORDER — EPHEDRINE SULFATE 50 MG/ML
INJECTION INTRAVENOUS AS NEEDED
Status: DISCONTINUED | OUTPATIENT
Start: 2024-02-22 | End: 2024-02-22 | Stop reason: SURG

## 2024-02-22 RX ORDER — MEPERIDINE HYDROCHLORIDE 25 MG/ML
12.5 INJECTION INTRAMUSCULAR; INTRAVENOUS; SUBCUTANEOUS AS NEEDED
Status: DISCONTINUED | OUTPATIENT
Start: 2024-02-22 | End: 2024-02-22 | Stop reason: HOSPADM

## 2024-02-22 RX ORDER — BETHANECHOL CHLORIDE 25 MG/1
50 TABLET ORAL 2 TIMES DAILY
Status: DISCONTINUED | OUTPATIENT
Start: 2024-02-22 | End: 2024-02-23

## 2024-02-22 RX ORDER — FINASTERIDE 5 MG/1
5 TABLET, FILM COATED ORAL DAILY
Status: DISCONTINUED | OUTPATIENT
Start: 2024-02-22 | End: 2024-02-23

## 2024-02-22 RX ORDER — SENNOSIDES 8.6 MG
17.2 TABLET ORAL NIGHTLY PRN
Status: DISCONTINUED | OUTPATIENT
Start: 2024-02-22 | End: 2024-02-23

## 2024-02-22 RX ORDER — ONDANSETRON 2 MG/ML
4 INJECTION INTRAMUSCULAR; INTRAVENOUS EVERY 6 HOURS PRN
Status: DISCONTINUED | OUTPATIENT
Start: 2024-02-22 | End: 2024-02-23

## 2024-02-22 RX ORDER — HYDROCODONE BITARTRATE AND ACETAMINOPHEN 5; 325 MG/1; MG/1
1 TABLET ORAL EVERY 6 HOURS PRN
Qty: 30 TABLET | Refills: 0 | Status: SHIPPED | OUTPATIENT
Start: 2024-02-22

## 2024-02-22 RX ORDER — NALOXONE HYDROCHLORIDE 0.4 MG/ML
0.08 INJECTION, SOLUTION INTRAMUSCULAR; INTRAVENOUS; SUBCUTANEOUS AS NEEDED
Status: DISCONTINUED | OUTPATIENT
Start: 2024-02-22 | End: 2024-02-22 | Stop reason: HOSPADM

## 2024-02-22 RX ORDER — HYDROMORPHONE HYDROCHLORIDE 1 MG/ML
0.4 INJECTION, SOLUTION INTRAMUSCULAR; INTRAVENOUS; SUBCUTANEOUS EVERY 2 HOUR PRN
Status: DISCONTINUED | OUTPATIENT
Start: 2024-02-22 | End: 2024-02-23

## 2024-02-22 RX ORDER — POLYETHYLENE GLYCOL 3350 17 G/17G
17 POWDER, FOR SOLUTION ORAL DAILY PRN
Status: DISCONTINUED | OUTPATIENT
Start: 2024-02-22 | End: 2024-02-23

## 2024-02-22 RX ORDER — FLUTICASONE FUROATE AND VILANTEROL 200; 25 UG/1; UG/1
1 POWDER RESPIRATORY (INHALATION) DAILY
Status: DISCONTINUED | OUTPATIENT
Start: 2024-02-23 | End: 2024-02-23

## 2024-02-22 RX ORDER — CIPROFLOXACIN 500 MG/1
500 TABLET, FILM COATED ORAL 2 TIMES DAILY
Qty: 14 TABLET | Refills: 0 | Status: SHIPPED | OUTPATIENT
Start: 2024-02-22 | End: 2024-02-29

## 2024-02-22 RX ORDER — BISACODYL 10 MG
10 SUPPOSITORY, RECTAL RECTAL
Status: DISCONTINUED | OUTPATIENT
Start: 2024-02-22 | End: 2024-02-23

## 2024-02-22 RX ORDER — DOCUSATE SODIUM 100 MG/1
100 CAPSULE, LIQUID FILLED ORAL 2 TIMES DAILY
Qty: 28 CAPSULE | Refills: 0 | Status: SHIPPED | OUTPATIENT
Start: 2024-02-22 | End: 2024-03-07

## 2024-02-22 RX ORDER — ONDANSETRON 4 MG/1
4 TABLET, ORALLY DISINTEGRATING ORAL EVERY 6 HOURS PRN
Status: DISCONTINUED | OUTPATIENT
Start: 2024-02-22 | End: 2024-02-23

## 2024-02-22 RX ORDER — ACETAMINOPHEN 500 MG
1000 TABLET ORAL ONCE AS NEEDED
Status: DISCONTINUED | OUTPATIENT
Start: 2024-02-22 | End: 2024-02-22 | Stop reason: HOSPADM

## 2024-02-22 RX ORDER — OXYCODONE HYDROCHLORIDE 5 MG/1
5 TABLET ORAL EVERY 4 HOURS PRN
Status: DISCONTINUED | OUTPATIENT
Start: 2024-02-22 | End: 2024-02-23

## 2024-02-22 RX ORDER — ACETAMINOPHEN 500 MG
1000 TABLET ORAL ONCE
Status: DISCONTINUED | OUTPATIENT
Start: 2024-02-22 | End: 2024-02-22 | Stop reason: HOSPADM

## 2024-02-22 RX ORDER — HYDROMORPHONE HYDROCHLORIDE 1 MG/ML
0.8 INJECTION, SOLUTION INTRAMUSCULAR; INTRAVENOUS; SUBCUTANEOUS EVERY 2 HOUR PRN
Status: DISCONTINUED | OUTPATIENT
Start: 2024-02-22 | End: 2024-02-23

## 2024-02-22 RX ORDER — LOSARTAN POTASSIUM 50 MG/1
50 TABLET ORAL DAILY
Status: DISCONTINUED | OUTPATIENT
Start: 2024-02-22 | End: 2024-02-23

## 2024-02-22 RX ORDER — ACETAMINOPHEN 500 MG
1000 TABLET ORAL EVERY 8 HOURS SCHEDULED
Status: DISCONTINUED | OUTPATIENT
Start: 2024-02-22 | End: 2024-02-23

## 2024-02-22 RX ORDER — ENOXAPARIN SODIUM 100 MG/ML
40 INJECTION SUBCUTANEOUS DAILY
Status: DISCONTINUED | OUTPATIENT
Start: 2024-02-23 | End: 2024-02-23

## 2024-02-22 RX ORDER — LIDOCAINE HYDROCHLORIDE 20 MG/ML
JELLY TOPICAL AS NEEDED
Status: DISCONTINUED | OUTPATIENT
Start: 2024-02-22 | End: 2024-02-22 | Stop reason: HOSPADM

## 2024-02-22 RX ORDER — SODIUM CHLORIDE, SODIUM LACTATE, POTASSIUM CHLORIDE, CALCIUM CHLORIDE 600; 310; 30; 20 MG/100ML; MG/100ML; MG/100ML; MG/100ML
INJECTION, SOLUTION INTRAVENOUS CONTINUOUS
Status: DISCONTINUED | OUTPATIENT
Start: 2024-02-22 | End: 2024-02-22 | Stop reason: HOSPADM

## 2024-02-22 RX ORDER — ALBUTEROL SULFATE 90 UG/1
2 AEROSOL, METERED RESPIRATORY (INHALATION) EVERY 6 HOURS PRN
Status: DISCONTINUED | OUTPATIENT
Start: 2024-02-22 | End: 2024-02-23

## 2024-02-22 RX ORDER — LEVOFLOXACIN 500 MG/1
500 TABLET, FILM COATED ORAL DAILY
Status: DISCONTINUED | OUTPATIENT
Start: 2024-02-23 | End: 2024-02-23

## 2024-02-22 RX ORDER — OXYCODONE HYDROCHLORIDE 10 MG/1
10 TABLET ORAL EVERY 4 HOURS PRN
Status: DISCONTINUED | OUTPATIENT
Start: 2024-02-22 | End: 2024-02-23

## 2024-02-22 RX ORDER — HYDROCODONE BITARTRATE AND ACETAMINOPHEN 5; 325 MG/1; MG/1
1 TABLET ORAL ONCE AS NEEDED
Status: DISCONTINUED | OUTPATIENT
Start: 2024-02-22 | End: 2024-02-22 | Stop reason: HOSPADM

## 2024-02-22 RX ORDER — BETHANECHOL CHLORIDE 50 MG/1
50 TABLET ORAL 2 TIMES DAILY
COMMUNITY

## 2024-02-22 RX ADMIN — DEXAMETHASONE SODIUM PHOSPHATE 4 MG: 4 MG/ML VIAL (ML) INJECTION at 10:34:00

## 2024-02-22 RX ADMIN — GLYCOPYRROLATE 0.2 MG: 0.2 INJECTION, SOLUTION INTRAMUSCULAR; INTRAVENOUS at 10:38:00

## 2024-02-22 RX ADMIN — EPHEDRINE SULFATE 5 MG: 50 INJECTION INTRAVENOUS at 11:35:00

## 2024-02-22 RX ADMIN — LIDOCAINE HYDROCHLORIDE 50 MG: 10 INJECTION, SOLUTION EPIDURAL; INFILTRATION; INTRACAUDAL; PERINEURAL at 10:29:00

## 2024-02-22 RX ADMIN — SODIUM CHLORIDE, SODIUM LACTATE, POTASSIUM CHLORIDE, CALCIUM CHLORIDE: 600; 310; 30; 20 INJECTION, SOLUTION INTRAVENOUS at 11:47:00

## 2024-02-22 RX ADMIN — EPHEDRINE SULFATE 5 MG: 50 INJECTION INTRAVENOUS at 10:32:00

## 2024-02-22 RX ADMIN — SODIUM CHLORIDE, SODIUM LACTATE, POTASSIUM CHLORIDE, CALCIUM CHLORIDE: 600; 310; 30; 20 INJECTION, SOLUTION INTRAVENOUS at 10:22:00

## 2024-02-22 RX ADMIN — LEVOFLOXACIN 500 MG: 5 INJECTION, SOLUTION INTRAVENOUS at 10:32:00

## 2024-02-22 NOTE — ANESTHESIA PREPROCEDURE EVALUATION
PRE-OP EVALUATION    Patient Name: Kaden Osborne    Admit Diagnosis: Benign prostatic hyperplasia with urinary retention [N40.1, R33.8]  Bladder stones [N21.0]    Pre-op Diagnosis: Benign prostatic hyperplasia with urinary retention [N40.1, R33.8]  Bladder stones [N21.0]    CYSTOSCOPY, TRANSURETHRAL RESECTION PROSTATE, LASER LITHOTRIPSY, REMOVAL OF BLADDER STONES    Anesthesia Procedure: CYSTOSCOPY, TRANSURETHRAL RESECTION PROSTATE, LASER LITHOTRIPSY, REMOVAL OF BLADDER STONES    Surgeon(s) and Role:     * Faisal Roy MD - Primary    Pre-op vitals reviewed.  Temp: 97.5 °F (36.4 °C)  Pulse: 54  Resp: 18  BP: 142/85  SpO2: 100 %  Body mass index is 22.88 kg/m².    Current medications reviewed.  Hospital Medications:   acetaminophen (Tylenol Extra Strength) tab 1,000 mg  1,000 mg Oral Once    lactated ringers infusion   Intravenous Continuous    levoFLOXacin in dextrose 5% (Levaquin) 500 mg/100mL IVPB premix 500 mg  500 mg Intravenous Once       Outpatient Medications:     Facility-Administered Medications Prior to Admission   Medication Dose Route Frequency Provider Last Rate Last Admin    ciprofloxacin (Cipro) tab 500 mg  500 mg Oral Once Faisal Roy MD         Medications Prior to Admission   Medication Sig Dispense Refill Last Dose    Bethanechol Chloride 50 MG Oral Tab Take 1 tablet (50 mg total) by mouth in the morning and 1 tablet (50 mg total) before bedtime.   2/21/2024 at 1700    fluticasone-salmeterol (WIXELA INHUB) 250-50 MCG/ACT Inhalation Aerosol Powder, Breath Activated Inhale 1 puff into the lungs Q12H. 180 each 0 2/22/2024 at 0530    dilTIAZem HCl ER Beads 120 MG Oral Capsule SR 24 Hr Take 1 capsule (120 mg total) by mouth daily.   2/22/2024 at 0530    tamsulosin 0.4 MG Oral Cap Take 2 capsules (0.8 mg total) by mouth every evening. 180 capsule 5 2/21/2024 at 1900    finasteride 5 MG Oral Tab Take 1 tablet (5 mg total) by mouth daily. 90 tablet 6 2/21/2024 at 0800    losartan Potassium 50  MG Oral Tab Take 1 tablet (50 mg total) by mouth daily.   2/21/2024 at 0800    Albuterol Sulfate  (90 Base) MCG/ACT Inhalation Aero Soln Inhale 2 puffs into the lungs every 6 (six) hours as needed for Wheezing or Shortness of Breath. 1 Inhaler 2 More than a month       Allergies: Patient has no known allergies.      Anesthesia Evaluation    Patient summary reviewed.    Anesthetic Complications           GI/Hepatic/Renal    Negative GI/hepatic/renal ROS.                             Cardiovascular      ECG reviewed.            (+) hypertension                                     Endo/Other    Negative endo/other ROS.                              Pulmonary      (+) asthma                     Neuro/Psych    Negative neuro/psych ROS.                                  Past Surgical History:   Procedure Laterality Date    COLONOSCOPY      TONSILLECTOMY       Social History     Socioeconomic History    Marital status:    Tobacco Use    Smoking status: Never     Passive exposure: Never    Smokeless tobacco: Never   Vaping Use    Vaping Use: Never used   Substance and Sexual Activity    Alcohol use: No    Drug use: No   Other Topics Concern    Caffeine Concern Yes     Comment: pop 2-4 daily/ tea    Exercise Yes     Comment: 4-5 x a week      History   Drug Use No     Available pre-op labs reviewed.     Lab Results   Component Value Date     02/05/2024    K 4.1 02/05/2024     02/05/2024    CO2 30.0 02/05/2024    BUN 15 02/05/2024    CREATSERUM 1.05 02/05/2024    GLU 84 02/05/2024    CA 9.9 02/05/2024            Airway      Mallampati: II       Cardiovascular    Cardiovascular exam normal.         Dental    Dentition appears grossly intact         Pulmonary    Pulmonary exam normal.                 Other findings  Chipped upper right teeth            ASA: 2   Plan: general  NPO status verified and           Plan/risks discussed with: patient                Present on Admission:  **None**

## 2024-02-22 NOTE — ANESTHESIA PROCEDURE NOTES
Airway  Date/Time: 2/22/2024 10:31 AM  Urgency: elective      General Information and Staff    Patient location during procedure: OR  Anesthesiologist: Mio Navarrete MD  Resident/CRNA: Denzel Mon CRNA  Performed: CRNA   Performed by: Denzel Mon CRNA  Authorized by: Mio Navarrete MD      Indications and Patient Condition  Indications for airway management: anesthesia  Sedation level: deep  Preoxygenated: yes  Patient position: sniffing  Mask difficulty assessment: 1 - vent by mask    Final Airway Details  Final airway type: supraglottic airway      Successful airway: classic  Size 4       Number of attempts at approach: 1

## 2024-02-22 NOTE — ANESTHESIA POSTPROCEDURE EVALUATION
[facility]    Kaden Osborne Patient Status:  Outpatient in a Bed   Age/Gender 68 year old male MRN [id number]   Location [facility] SURGERY Attending Faisal Roy MD   Hosp Day # 0 PCP Onur Mclean MD       Anesthesia Post-op Note    CYSTOSCOPY, TRANSURETHRAL RESECTION PROSTATE, LASER LITHOTRIPSY, REMOVAL OF BLADDER STONES    Procedure Summary       Date: 02/22/24 Room / Location:  MAIN OR  /  MAIN OR    Anesthesia Start: 1020 Anesthesia Stop: 1203    Procedure: CYSTOSCOPY, TRANSURETHRAL RESECTION PROSTATE, LASER LITHOTRIPSY, REMOVAL OF BLADDER STONES (Urethra) Diagnosis:       Benign prostatic hyperplasia with urinary retention      Bladder stones      (Benign prostatic hyperplasia with urinary retention [N40.1, R33.8]Bladder stones [N21.0])    Surgeons: Faisal Roy MD Anesthesiologist: Mio Navarrete MD    Anesthesia Type: general ASA Status: 2            Anesthesia Type: general    Vitals Value Taken Time   /64 02/22/24 1208   Temp 97.4 02/22/24 1208   Pulse 68 02/22/24 1208   Resp 18 02/22/24 1208   SpO2 98% 02/22/24 1208       Patient Location: PACU    Anesthesia Type: general    Airway Patency: patent    Postop Pain Control: adequate    Mental Status: mildly sedated but able to meaningfully participate in the post-anesthesia evaluation    Nausea/Vomiting: none    Cardiopulmonary/Hydration status: stable euvolemic    Complications: no apparent anesthesia related complications    Postop vital signs: stable    Dental Exam: Unchanged from Preop    Patient to be discharged from PACU when criteria met.

## 2024-02-22 NOTE — H&P
HPI:     Kaden Osborne is a 68 year old male with a PMH of HTN, asthma, Raynaud's.    Following for:  1. Elevated PSA  2. BPH/LUTS and incidentally noted to have > 1 L in bladder 10/2/23  - 0.8 mg flomax and proscar 10/2/23. Started flomax 2021 and not sure if this helps  - on 3 now 4 x per day CIC since 10/2/23  - Cysto 12/5/23: mild-mod BPH with mod ARNOLDO. Three large (~ 1.5 cm) bladder stones. No other abnormalities  3. Kidney stones  - possibly passed one in 2021, no procedures  4. Retention with urinary and fecal incontinence when he has to urinate and during runs  - UDS 12/5/23: first sensation 32 mL, first desire 462 mL, strong desire 650 mL, max bladder capacity of 730 mL. No leakage with valsalva. The patient was not able to void with max detrusor pressure of 8 cm H2O but he didn't really try to void as he was worried about fecal incontinence and 813 mL PVR.    PCP - YOSEPH Mclean    Presents for TURP, Holmium lithotripsy and removal of bladder stones.    He is performing 3 times daily CIC with 300-900 mL out with caths. He is not voiding during the day. He feels much better since starting CIC. Appetite and energy are good. He is taking 0.8 mg flomax and proscar. Taking 50 mg bethanechol BID and had upset stomach with higher dose.    He has not started PFT for h/o incontinence. Again discussed the importance of this and he is not sure if he will do this. He may also see GI to discuss.     AUA SS was 17/35 prior to CIC with 4 n, u, f; 2 w; 1 s, I, WILLIAM. Mixed feelings towards LUTS.  Urinary incontinence: only when he has > 1000 mL in bladder. Prior to CIC was reporting yes, urge (and probably overflow incontinence) and changing underwear 2-3 x per day  Stool incontinence: maybe once a day with a full bladder  Abd exam LOV: large, palpable bladder on exam  Penoscrotal LOV: no abnormalities  TISHA LOV: > 50 g prostate, no nodules or tenderness     UA is negative and PVR was 297 mL LOV after catherizing a few  hours prior. Was > 1 L during initial visit.     Moderate ED. Currently masturbates without issue.     Prior PSAs:  - 4.43 6/28/23  - 3.99 7/5/22  - 3.37 9/27/21  - 3.60 9/24/20  - 3.72 9/23/19  - 3.35 10/12/18  - < 3 y prior     UTI hx: none  Gross hematuria: none  Tobacco hx: none  Fam h/o  malignancy: none    Reported no water, 20 oz baca, 20 milk, 36 soda with clear urine. Now 40 oz water, 20 baca, 20 milk 40 soda with medium yellow urine. I encouraged the pt drink at least 40-60 oz water per day or enough to keep urine clear to pale yellow for UTI and stone prevention.     CT SP 11/28/23: three large (10-16 mm) bladder stones, 3 RMP, BPH with BWT  CT AP 1/24/21: 1 mm distal left ureteral calc, BPH with marked bladder distention     Discussed that UDS would indicate poor detrusor activity and poor sensation but patient apparently did not try to void as he was worried about leaking stool so we don't actually know if he has detrusor activity.    Discussed we could repeat UDS and have him try to void if he wants more info prior to making a decision. Discussed TURP risks and benefits and possible SEs as well as typical recovery course.    We also discussed his PSA was also elevated so would be good idea to sample prostate either with biopsy or we would be doing this during TURP anyways.    After discussing all these options in detail he wants to proceed to OR for TURP with bladder stones removal.     Prior note:  Will continue flomax, proscar for BPH.  Increase CIC frequency to 4-5 x daily CIC and titrate CIC to keep values < 500 mL. He will call to schedule PFT for urinary and bowel incontinence and will consider seeing GI as well for bowel incontinence. Starting bethanechol to see if this helps with bladder contractility.    Past Medical History:   Diagnosis Date    Aortic aneurysm (HCC)     Arrhythmia     Asthma (HCC)     Bicuspid aortic valve (HCC)     Hearing impairment     decreased hearing does  not wear  any hearing aids    Hearing loss     Heart valve disease     bicuspid aortic valve    High blood pressure     Raynaud's syndrome     Unspecified essential hypertension     Visual impairment     glasses    Wears glasses       Past Surgical History:   Procedure Laterality Date    COLONOSCOPY      TONSILLECTOMY        Family History   Problem Relation Age of Onset    Heart Attack Father     Hypertension Father     Cancer Mother         Colon/Liver    Colon Cancer Mother     Breast Cancer Sister     Breast Cancer Sister     Cancer Other         family hx, Colon      Social History:   Social History     Socioeconomic History    Marital status:    Tobacco Use    Smoking status: Never     Passive exposure: Never    Smokeless tobacco: Never   Vaping Use    Vaping Use: Never used   Substance and Sexual Activity    Alcohol use: No    Drug use: No   Other Topics Concern    Caffeine Concern Yes     Comment: pop 2-4 daily/ tea    Exercise Yes     Comment: 4-5 x a week         Medications (Active prior to today's visit):  No current outpatient medications on file.       Allergies:  No Known Allergies      ROS:     A comprehensive 10 point review of systems was completed.  Pertinent positives and negatives noted in the the HPI.    PHYSICAL EXAM:     GENERAL APPEARANCE: well, developed, well nourished, in no acute distress  NEUROLOGIC: nonfocal, alert and oriented  HEAD: normocephalic, atraumatic  EYES: sclera non-icteric  EARS: hearing intact  ORAL CAVITY: mucosa moist  NECK/THYROID: no obvious goiter or masses  LUNGS: nonlabored breathing  ABDOMEN: soft, no obvious masses or tenderness  SKIN: no obvious rashes    : as noted above    ASSESSMENT/PLAN:   Diagnoses and all orders for this visit:    Pre-op testing  -     Basic Metabolic Panel (8); Future  -     EKG 12 Lead; Future    Other orders  -     Vital signs; Standing  -     Notify anesthesia vital signs; Standing  -     PAT to instruct patients; Standing  -      acetaminophen (Tylenol Extra Strength) tab 1,000 mg  -     NPO; Standing  -     Insert/Maintain PIV; Standing  -     lactated ringers infusion  -     Activity as tolerated Until Discontinued; Standing  -     Height and weight; Standing  -     Verify informed consent; Standing  -     levoFLOXacin in dextrose 5% (Levaquin) 500 mg/100mL IVPB premix 500 mg    - as noted above.    Thanks again for this consult.    Faisal Roy MD, FACS  Urologist  [facility]  Office: [phone]

## 2024-02-22 NOTE — OPERATIVE REPORT
Urology Operative Note    Attending Surgeon: Faisal Roy MD    Patient Name: Kaden Osborne    Date of Surgery: 2/22/2024    Preoperative Diagnosis: BPH, bladder stones, urinary retention    Postoperative Diagnosis: same    Procedure Performed: Cystoscopy, Holmium laser lithotripsy of bladder stones with stones removal, transurethral resection of the prostate    Indication for procedure:  Patient is a 68 year old male who presented with urinary retention requiring CIC as well as large bladder stones. He was counseled on options and elected to undergo the aforementioned procedure. We discussed the risks and benefits to surgery. We discussed risks including, but not limited to, bleeding, infection, possible damage to surrounding structures. The patient understood these risks and wished to proceed with surgery.  Description of the procedure:  The patient was taken to the operating room and prepped and draped in lithotomy position after undergoing general anesthesia.   The cystoscope was inserted. He was again noted to have three large bladder stones (total stone burden was ~ 5 cm) as well as severely obstructing prostate. We then proceeded with Holmium laser lithotripsy and removal of bladder stones. We then proceeded with transurethral resection of the prostate. I dissected down to the prostatic capsule on all sides and fulgurated oozing vessels. The prostatic urethra was wide open at the end of the case. There was no significant bleeding noted at the end of the case. The TURP chips were removed. I inserted a 22-Bulgarian 3 way Richard catheter. The urine was clear on a low rate of bladder irrigation.   The patient was awoken having tolerated the procedure well.    Specimen: 1. Bladder stone fragments. 2. TURP chips    Complications: No known complications    Condition on Discharge from the operating room was stable    Plan: The patient will be admitted overnight and plan for home tomorrow with richard  catheter.    Faisal Roy MD  Date: 2/22/2024  Time: 11:59 AM

## 2024-02-22 NOTE — PROGRESS NOTES
NURSING ADMISSION NOTE      Patient admitted via  bed from PACU  Oriented to room.  Safety precautions initiated.  Bed in low position.  Call light in reach.    A&O x4, [place]. Denies any CP, MILLER, or calf pain at present. Lungs clear bilaterally. Abdomen soft, nontender, nondistended. Bowel sounds active, pt denies any nausea. Diet as tolerated. CBI infusing at slow rate - clear diluted output noted. SL. Pt denies any pain at this time. POC discussed and pt verbalizes understanding. Pt resting in bed, call light within reach, safety precautions in place.

## 2024-02-22 NOTE — DISCHARGE INSTRUCTIONS
You had a procedure called a CYSTOSCOPY today    OK to use leg bag during the day. Use gravity bag at night and while sleeping. Keep richard bag below the level of the bladder at all times to prevent UTI. OK to shower.    Leave richard catheter in place until your follow up appointment.    - No heavy lifting or strenuous activity for 4 weeks.    - You may have a post-operative appointment that is already scheduled for you. If the appointment date or time does not work with your schedule please call our office to reschedule ([phone]). Alternatively our office may be reaching out to you to schedule the appointment.     - You may experience pain after the procedure for 1-2 days.  If pain becomes intolerable please contact our office or go to the nearest Emergency Room/Immediate Care. You make take over-the counter ibuprofen for mild pain (provided you do not have a medical condition such as stomach ulcers or kidney disease which prohibits you from taking). You may take this in addition to tylenol or narcotic pain medication. Hot packs may help for discomfort as well.    - If you take blood thinners (such as aspirin or plavix) please DO NOT take them when you go home. You may resume these medications in 4 weeks.    - If you are medically allowed to take ibuprofen then you may take 200-400 mg every 8 hours as needed for pain with plenty of fluids. You may take this in addition to tylenol or other pain medication which may be prescribed.     - You may experience burning with urination and frequency of urination over the next few days.     - You may see blood in your urine that should clear up within a few days. If you have a stent in place then you may see blood in the urine until the stent is eventually removed.     - Try to abstain from alcohol, coffee, tea, artificial sweeteners, and spicy food for the next 48 hours as these can irritate the bladder.     - If you develop a fever, chills, difficulty urinating or  abdominal pain in the next 24 hours, call the office.     - Drink 1.5 to 2 liters of fluid today, water is preferable. If you are on a fluid restriction due to other medical reasons then you need to adhere to your fluid restriction recommendations.

## 2024-02-23 ENCOUNTER — PATIENT OUTREACH (OUTPATIENT)
Dept: CASE MANAGEMENT | Age: 69
End: 2024-02-23

## 2024-02-23 VITALS
OXYGEN SATURATION: 96 % | SYSTOLIC BLOOD PRESSURE: 104 MMHG | DIASTOLIC BLOOD PRESSURE: 76 MMHG | WEIGHT: 164 LBS | BODY MASS INDEX: 22.96 KG/M2 | HEIGHT: 71 IN | RESPIRATION RATE: 20 BRPM | HEART RATE: 59 BPM | TEMPERATURE: 98 F

## 2024-02-23 NOTE — PROGRESS NOTES
NURSING DISCHARGE NOTE    Discharged Home via Ambulatory.  Wheelchair offered, pt declined.  Accompanied by Family member  Belongings Taken by patient/family.    VSS, tolerating diet, pain controlled, tolerating ambulating. Obrien education discussed, pt verbalized understanding. Discharge education provided. Reviewed medications and follow up appts. All questions answered and concerns addressed, pt verbalized understanding. Pt dc in stable condition

## 2024-02-23 NOTE — PLAN OF CARE
Alert x 4. VSS on RA. No complaints of pain. CBI infusing at a slow rate, urine clear and yellow. POC discussed. All current needs met. Call light in reach.

## 2024-02-23 NOTE — DISCHARGE SUMMARY
[facility]    Discharge Summary    Kaden Osborne Patient Status:  Outpatient in a Bed    1955 MRN [id number]   Location [facility] 3NW-A Attending Faisal Roy MD   Hosp Day # 0 PCP Onur Mclean MD     Date of Admission: 2024     Date of Discharge: 24    Disposition: Home or Self Care     Admitting Diagnosis: Benign prostatic hyperplasia with urinary retention [N40.1, R33.8]  Bladder stones [N21.0]    Discharge Diagnosis:   Patient Active Problem List   Diagnosis    Raynaud's syndrome    Essential hypertension    Family history of colon cancer    Mild persistent asthma without complication (HCC)    Hearing loss    Bicuspid aortic valve (HCC)    Eczema    Paroxysmal supraventricular tachycardia    SVT (supraventricular tachycardia)    Thoracic ascending aortic aneurysm (HCC)    Hypertension, benign       Reason for Admission:  You came to the hospital for a transurethral resection of the prostate (TURP) in the operating room.    Physical Exam:    CONSTITUTIONAL: Well developed, well nourished, in no acute distress  NEUROLOGIC: Alert and oriented  HEAD: Normocephalic, atraumatic  EYES: Sclera non-icteric  ENT: Hearing intact, moist mucous membranes  NECK: No obvious goiter or masses  RESPIRATORY: Normal respiratory effort  SKIN: No evident rashes  ABDOMEN: Soft, non-tender, non-distended  GENITOURINARY: Obrien draining pink urine     History of Present Illness:   Kaden Osborne is a 68 year old male with history of bladder outlet obstruction due to BPH, refractory to medical therapy, here for TURP.    Hospital Course:  Patient underwent an uncomplicated TURP, and was admitted to the floor pos-operatively. Continuous bladder irrigation was run overnight, and then was clamped in the morning.     Given that he has had long-standing urinary retention, the Obrien catheter was left in place with plans for a void trial a few days after discharge. It was draining well with  pink/light red urine in the tubing without clots.    By time of discharge, the patient was ambulating and tolerating a regular diet without issue. Pain was well controlled with oral pain medications.     Consultations: None    Procedures: Transurethral resection of the prostate (TURP)    Complications: None    Discharge Condition: Good         Discharge Medications:      Discharge Medications        START taking these medications        Instructions Prescription details   ciprofloxacin 500 MG Tabs  Commonly known as: Cipro      Take 1 tablet (500 mg total) by mouth 2 (two) times daily for 7 days.   Stop taking on: February 29, 2024  Quantity: 14 tablet  Refills: 0     docusate sodium 100 MG Caps  Commonly known as: Colace      Take 1 capsule (100 mg total) by mouth 2 (two) times daily for 14 days. Stop taking if loose stools/diarrhea.   Stop taking on: March 7, 2024  Quantity: 28 capsule  Refills: 0     HYDROcodone-acetaminophen 5-325 MG Tabs  Commonly known as: Norco      Take 1 tablet by mouth every 6 (six) hours as needed for Pain.   Quantity: 30 tablet  Refills: 0            CONTINUE taking these medications        Instructions Prescription details   albuterol 108 (90 Base) MCG/ACT Aers  Commonly known as: Ventolin HFA      Inhale 2 puffs into the lungs every 6 (six) hours as needed for Wheezing or Shortness of Breath.   Quantity: 1 Inhaler  Refills: 2     Bethanechol Chloride 50 MG Tabs  Commonly known as: URECHOLINE      Take 1 tablet (50 mg total) by mouth in the morning and 1 tablet (50 mg total) before bedtime.   Refills: 0     dilTIAZem HCl ER Beads 120 MG Cp24  Commonly known as: TIAZAC      Take 1 capsule (120 mg total) by mouth daily.   Refills: 0     finasteride 5 MG Tabs  Commonly known as: Proscar      Take 1 tablet (5 mg total) by mouth daily.   Quantity: 90 tablet  Refills: 6     fluticasone-salmeterol 250-50 MCG/ACT Aepb  Commonly known as: Wixela Inhub      Inhale 1 puff into the lungs Q12H.    Quantity: 180 each  Refills: 0     losartan 50 MG Tabs  Commonly known as: Cozaar      Take 1 tablet (50 mg total) by mouth daily.   Refills: 0     tamsulosin 0.4 MG Caps  Commonly known as: Flomax      Take 2 capsules (0.8 mg total) by mouth every evening.   Quantity: 180 capsule  Refills: 5               Where to Get Your Medications        These medications were sent to [id number] - [place], [place] - [address] AT [facility] [address], [phone], [phone]  [address], [facility] [address]      Phone: [phone]   ciprofloxacin 500 MG Tabs  docusate sodium 100 MG Caps  HYDROcodone-acetaminophen 5-325 MG Tabs         Follow up Visits:     Your appointments       Date & Time Appointment Department ([place])    Mar 04, 2024 10:00 AM CST Post Op Visit with Tanisha Coates PA-C [facility] ([facility] 4)        Jun 24, 2024 12:00 PM CDT Post Op Visit with Faisal Roy MD [facility] ([facility] 4)              [facility] 4  [address]  [facility] [id number] [phone]             Follow up Labs: None    Discharge Instructions:     - No heavy lifting or strenuous activity for 4 weeks. You should continue walking and light activity daily.    - Make sure to avoid constipation. I prescribed Miralax for you to take daily at first. You can stop this if you start having loose stools or diarrhea, but the goal is for 1-2 soft bowel movements without straining, every day for the next 4 weeks.    - You may experience mild pain after the procedure for a few days.  If the pain becomes intolerable please contact our office or go to the nearest Emergency Room or Urgent Care. You should take over the counter tylenol for mild pain, and can alternate with ibuprofen (AKA motrin, advil - provided you do not have a  medical condition such as stomach ulcers or kidney disease which prohibits you from taking these) if pain persists. If pain is still not relieved by tylenol and/or ibuprofen, you may take narcotic pain medication if prescribed (typically oxycodone or tramadol). If you are taking narcotic pain medication this can make you constipated, so you should take over the counter stool softeners or miralax if prescribed.    - Warm pack or hot baths often help with discomfort after cystoscopy.    - If you take blood thinners (such as aspirin or plavix) please hold these medications until 4 weeks after surgery.     - You may experience burning and frequency of urination over the next few days. This will improve after a few days if you stay well hydrated.     - You are likely to see some blood in your urine (pink or light red urine) that should clear up within a few days. Staying well hydrated should help this clear up. If you notice the urine stays dark red or there are multiple large blood clots despite good hydration, please call the urology clinic ([phone]).     - Try to abstain from alcohol, coffee, tea, artificial sweeteners, and spicy food for the next 48 hours as these can irritate the bladder.    - Drink 1.5 to 2 liters of fluid today (water is preferable). If you are on a fluid restriction due to other medical reasons then you need to adhere to your fluid restriction recommendations.    - If you are discharged with a Obrien catheter in place, you will be scheduled to return for removal. If you were prescribed Oxybutynin on discharge (to help with bladder spasms), you should stop taking this 24 hours before your follow-up appointment. If the Obrien catheter stops draining, reposition yourself and check for kinks in the tubing. If this persists and you are not seeing any urine drain, and you notice bladder discomfort please call or go to the nearest emergency department as soon as possible.     - If you develop fevers  / chills, difficulty urinating, or severe abdominal pain please call the office or go to the nearest emergency department.

## 2024-02-26 ENCOUNTER — PATIENT OUTREACH (OUTPATIENT)
Dept: CASE MANAGEMENT | Age: 69
End: 2024-02-26

## 2024-02-26 DIAGNOSIS — Z02.9 ENCOUNTERS FOR ADMINISTRATIVE PURPOSE: Primary | ICD-10-CM

## 2024-02-26 DIAGNOSIS — N40.1 BENIGN PROSTATIC HYPERPLASIA WITH URINARY RETENTION: ICD-10-CM

## 2024-02-26 DIAGNOSIS — R33.8 BENIGN PROSTATIC HYPERPLASIA WITH URINARY RETENTION: ICD-10-CM

## 2024-02-26 PROCEDURE — 1111F DSCHRG MED/CURRENT MED MERGE: CPT

## 2024-02-26 PROCEDURE — 1159F MED LIST DOCD IN RCRD: CPT

## 2024-02-27 ENCOUNTER — TELEPHONE (OUTPATIENT)
Dept: FAMILY MEDICINE CLINIC | Facility: CLINIC | Age: 69
End: 2024-02-27

## 2024-02-27 NOTE — PROGRESS NOTES
Initial Post Discharge Follow Up   Discharge Date: 2/23/24  Contact Date: 2/26/2024    Consent Verification:  Assessment Completed With: Patient  HIPAA Verified?  Yes    Discharge Dx:   Benign prostatic hyperplasia with urinary retention  Bladder stones    General:   How have you been since your discharge from the hospital? Bp pain urine    uro 3/4. I'm feeling great. The whole procedure went great and haven't had any pain. Monday. I can't believe I haven't had any pain. My urine is coming out great.   Do you have any pain since discharge?  No    How well was your pain managed while in the hospital?   On a scale of 1-5   1- Very Poor and 5- Very well   Very Well  When you were leaving the hospital were your discharge instructions reviewed with you? Yes  How well were your discharge instructions explained to you?   On a scale of 1-5   1- Very Poor and 5- Very well   Very Well  Do you have any questions about your discharge instructions?  No  Before leaving the hospital was your diagnoses explained to you? Yes  Do you have any questions about your diagnoses? No  Are you able to perform normal daily activities of living as you have prior to your hospital stay (dressing, bathing, ambulating to the bathroom, etc)? yes  (NCM) Was patient given a different diet per AVS? no      Medications:   Current Outpatient Medications   Medication Sig Dispense Refill    Bethanechol Chloride 50 MG Oral Tab Take 1 tablet (50 mg total) by mouth in the morning and 1 tablet (50 mg total) before bedtime.      HYDROcodone-acetaminophen (NORCO) 5-325 MG Oral Tab Take 1 tablet by mouth every 6 (six) hours as needed for Pain. 30 tablet 0    docusate sodium 100 MG Oral Cap Take 1 capsule (100 mg total) by mouth 2 (two) times daily for 14 days. Stop taking if loose stools/diarrhea. 28 capsule 0    ciprofloxacin 500 MG Oral Tab Take 1 tablet (500 mg total) by mouth 2 (two) times daily for 7 days. 14 tablet 0    fluticasone-salmeterol (WIXELA  INHUB) 250-50 MCG/ACT Inhalation Aerosol Powder, Breath Activated Inhale 1 puff into the lungs Q12H. 180 each 0    dilTIAZem HCl ER Beads 120 MG Oral Capsule SR 24 Hr Take 1 capsule (120 mg total) by mouth daily.      tamsulosin 0.4 MG Oral Cap Take 2 capsules (0.8 mg total) by mouth every evening. 180 capsule 5    finasteride 5 MG Oral Tab Take 1 tablet (5 mg total) by mouth daily. 90 tablet 6    losartan Potassium 50 MG Oral Tab Take 1 tablet (50 mg total) by mouth daily.      Albuterol Sulfate  (90 Base) MCG/ACT Inhalation Aero Soln Inhale 2 puffs into the lungs every 6 (six) hours as needed for Wheezing or Shortness of Breath. 1 Inhaler 2     Were there any changes to your current medication(s) noted on the AVS? Yes  If so, were these medication changes discussed with you prior to leaving the hospital? Yes  If a new medication was prescribed:    Was the new medication's purpose & side effects reviewed? Yes  Do you have any questions about your new medication? No  Did you  your discharge medications when you left the hospital? Yes  Let's go over your medications together to make sure we are not missing anything. Medications Reviewed  Are there any reasons that keep you from taking your medication as prescribed? No  Are you having any concerns with constipation? No  Did patient receive their flu shot (Sept-March)? Yes    Discharge medications reviewed/discussed/and reconciled against outpatient medications with patient.  Any changes or updates to medications sent to PCP.  Patient Acknowledged     Referrals/orders at D/C:  Referrals/orders placed at D/C? no    DME ordered at D/C? No      Discharge orders, AVS reviewed and discussed with patient. Any changes or updates to orders sent to PCP.  Patient Acknowledged      SDOH:   Transportation Needs: No Transportation Needs (2/22/2024)    Transportation Needs     Lack of Transportation: No     Financial Resource Strain: Low Risk  (2/27/2024)    Financial  Resource Strain     Difficulty of Paying Living Expenses: Not hard at all     Med Affordability: No         Follow up appointments:      Your appointments       Date & Time Appointment Department ([place])    Mar 04, 2024 10:00 AM CST Post Op Visit with Tanisha Coates PA-C [facility] ([facility] 4)        Jun 24, 2024 12:00 PM CDT Post Op Visit with Faisal Roy MD [facility] ([facility] 4)              [facility]  [address]  [facility] [id number] [phone]            TCC  Was TCC ordered: No      PCP (If no TCC appointment)  Does patient already have a PCP appointment scheduled? No  NCM Attempted to schedule PCP office TCM appointment with patient   If no appointment scheduled: Explain-pt declined stating that he is following up with urology on 3/4    Specialist    Does the patient have any other follow up appointment(s) needing to be scheduled? No, pt already has an appt with Uro on 3/4/24      Is there any reason as to why you cannot make your appointment(s)?  No     Needs post D/C:   Now that you are home, are there any needs or concerns you need addressed before your next visit with your PCP?  (DME, meds, questions, etc.): No    Interventions by NCM:   NCM reviewed discharge instructions and when to seek medical attention with the patient. He states that he is feeling great and is ecstatic by the fact that he has had no pain since the surgery. He has not needed to use any pain medication. NCM instructed on s/s of infection; he v/u. He states that his urine is draining good and clear. He denied having any fever, n/v/c/d, sob, pain, lightheadedness, HA Or any other symptoms. Med review completed. He denied having any questions or concerns at this time.       CCM referral placed:    No    BOOK BY DATE: 3/8/24

## 2024-02-27 NOTE — TELEPHONE ENCOUNTER
MAXIMILIANO, Spoke to patient for TCM today.  Patient states that he is feeling great and is following up with urology on Monday and does not need a follow up with PCP at this time.  TCM appointment recommended by 3/8/24 as patient is a Moderate risk for readmission.      BOOK BY DATE (last date for TCM): 3/8/24

## 2024-03-04 ENCOUNTER — OFFICE VISIT (OUTPATIENT)
Dept: SURGERY | Facility: CLINIC | Age: 69
End: 2024-03-04
Payer: MEDICARE

## 2024-03-04 DIAGNOSIS — N40.1 BPH WITH URINARY OBSTRUCTION: Primary | ICD-10-CM

## 2024-03-04 DIAGNOSIS — N13.8 BPH WITH URINARY OBSTRUCTION: Primary | ICD-10-CM

## 2024-03-04 DIAGNOSIS — R33.9 URINARY RETENTION: ICD-10-CM

## 2024-03-04 DIAGNOSIS — N21.0 BLADDER STONES: ICD-10-CM

## 2024-03-04 PROCEDURE — 1111F DSCHRG MED/CURRENT MED MERGE: CPT | Performed by: PHYSICIAN ASSISTANT

## 2024-03-04 PROCEDURE — 1159F MED LIST DOCD IN RCRD: CPT | Performed by: PHYSICIAN ASSISTANT

## 2024-03-04 PROCEDURE — 1160F RVW MEDS BY RX/DR IN RCRD: CPT | Performed by: PHYSICIAN ASSISTANT

## 2024-03-04 PROCEDURE — 99024 POSTOP FOLLOW-UP VISIT: CPT | Performed by: PHYSICIAN ASSISTANT

## 2024-03-04 NOTE — PROGRESS NOTES
Subjective:   Kaden Osborne is a 68 year old male who presents for post op check following Cystoscopy, Holmium laser lithotripsy of bladder stones with stones removal, transurethral resection of the prostate 2/22/24 with Dr. Roy. He was discharged 2/23/24 with richard catheter and remains in place.    He had been performing CIC prior to surgery due to urinary retention. On finasteride and tamsulosin.     History/Other:    No Further Nursing Notes to Review         Current Outpatient Medications   Medication Sig Dispense Refill    Bethanechol Chloride 50 MG Oral Tab Take 1 tablet (50 mg total) by mouth in the morning and 1 tablet (50 mg total) before bedtime.      HYDROcodone-acetaminophen (NORCO) 5-325 MG Oral Tab Take 1 tablet by mouth every 6 (six) hours as needed for Pain. (Patient not taking: Reported on 2/27/2024) 30 tablet 0    docusate sodium 100 MG Oral Cap Take 1 capsule (100 mg total) by mouth 2 (two) times daily for 14 days. Stop taking if loose stools/diarrhea. 28 capsule 0    fluticasone-salmeterol (WIXELA INHUB) 250-50 MCG/ACT Inhalation Aerosol Powder, Breath Activated Inhale 1 puff into the lungs Q12H. 180 each 0    dilTIAZem HCl ER Beads 120 MG Oral Capsule SR 24 Hr Take 1 capsule (120 mg total) by mouth daily.      tamsulosin 0.4 MG Oral Cap Take 2 capsules (0.8 mg total) by mouth every evening. 180 capsule 5    finasteride 5 MG Oral Tab Take 1 tablet (5 mg total) by mouth daily. 90 tablet 6    losartan Potassium 50 MG Oral Tab Take 1 tablet (50 mg total) by mouth daily.      Albuterol Sulfate  (90 Base) MCG/ACT Inhalation Aero Soln Inhale 2 puffs into the lungs every 6 (six) hours as needed for Wheezing or Shortness of Breath. 1 Inhaler 2       Review of Systems:  Pertinent items are noted in HPI.      Objective:   There were no vitals taken for this visit. Estimated body mass index is 22.88 kg/m² as calculated from the following:    Height as of 2/22/24: 5' 11\" (1.803 m).    Weight  as of 2/22/24: 164 lb 0.4 oz (74.4 kg).    No distress  Non labored respirations  Richard draining clear yellow - balloon deflated and richard removed tip intact without difficulty      Laboratory Data:  Lab Results   Component Value Date    WBC 6.2 06/28/2023    HGB 14.0 06/28/2023    .0 06/28/2023     Lab Results   Component Value Date     02/05/2024    K 4.1 02/05/2024     02/05/2024    CO2 30.0 02/05/2024    BUN 15 02/05/2024    GLU 84 02/05/2024    GFRAA 88 07/05/2022    AST 27 06/28/2023    ALT 32 06/28/2023    TP 7.3 06/28/2023    ALB 4.2 06/28/2023    CA 9.9 02/05/2024       Urinalysis Results (last three years):  Recent Labs     08/06/21  1334 10/02/23  0945   COLORUR Yellow  --    CLARITY Clear  --    SPECGRAVITY 1.013 1.025   PHURINE 6.0 6.0   PROUR Negative  --    GLUUR Negative  --    KETUR Negative  --    BILUR Negative  --    BLOODURINE Small*  --    NITRITE Negative Negative   UROBILINOGEN <2.0  --    LEUUR Negative  --    WBCUR 6-10*  --    RBCUR >10*  --    BACUR None Seen  --        Urine Culture Results (last three years):  No results found for: \"URINECUL\"    Imaging  No results found.    Assessment & Plan:   BPH with urinary retention  S/p cystoscopy, Holmium laser lithotripsy of bladder stones with stones removal, transurethral resection of the prostate 2/22/24, path negative  Richard removed today  Continue current medications, resume CIC if needed  Follow up in 2 weeks for PVR check.      Tanisha Rodriguez PA-C, 3/4/2024

## 2024-03-04 NOTE — PATIENT INSTRUCTIONS
If you are not voiding in 6 hours you should perform catheterization. If you are voiding freely, perform catheterization following a void before bed. If volume 300-500mL anticipate continuation of catheterization at night before bed the next few weeks. If volumes decrease <200mL you may be able to discontinue catheterization. If you are unable to void you will need to continue regimen that you were using before surgery.    If you have any questions please let me know.  Tanisha

## 2024-03-06 ENCOUNTER — APPOINTMENT (OUTPATIENT)
Dept: PHYSICAL THERAPY | Facility: HOSPITAL | Age: 69
End: 2024-03-06
Attending: UROLOGY
Payer: MEDICARE

## 2024-03-08 ENCOUNTER — APPOINTMENT (OUTPATIENT)
Dept: PHYSICAL THERAPY | Facility: HOSPITAL | Age: 69
End: 2024-03-08
Attending: UROLOGY
Payer: MEDICARE

## 2024-03-09 LAB
CAHPO4 (BRUSHITE): 10 %
CAOX DIHYDRATE: 65 %
CAOX MONOHYDRATE: 20 %
HYDROXYAPATITE: 5 %
WEIGHT-STONE: 779 MG

## 2024-03-18 ENCOUNTER — APPOINTMENT (OUTPATIENT)
Dept: PHYSICAL THERAPY | Facility: HOSPITAL | Age: 69
End: 2024-03-18
Attending: UROLOGY
Payer: MEDICARE

## 2024-03-20 ENCOUNTER — OFFICE VISIT (OUTPATIENT)
Dept: SURGERY | Facility: CLINIC | Age: 69
End: 2024-03-20
Payer: MEDICARE

## 2024-03-20 DIAGNOSIS — N13.8 BPH WITH URINARY OBSTRUCTION: Primary | ICD-10-CM

## 2024-03-20 DIAGNOSIS — N40.1 BPH WITH URINARY OBSTRUCTION: Primary | ICD-10-CM

## 2024-03-20 LAB
APPEARANCE: CLEAR
BILIRUBIN: NEGATIVE
GLUCOSE (URINE DIPSTICK): NEGATIVE MG/DL
KETONES (URINE DIPSTICK): NEGATIVE MG/DL
MULTISTIX LOT#: ABNORMAL NUMERIC
NITRITE, URINE: POSITIVE
PH, URINE: 5.5 (ref 4.5–8)
PROTEIN (URINE DIPSTICK): >=300 MG/DL
SPECIFIC GRAVITY: 1.03 (ref 1–1.03)
URINE-COLOR: YELLOW
UROBILINOGEN,SEMI-QN: 0.2 MG/DL (ref 0–1.9)

## 2024-03-20 PROCEDURE — 1111F DSCHRG MED/CURRENT MED MERGE: CPT | Performed by: PHYSICIAN ASSISTANT

## 2024-03-20 PROCEDURE — 1160F RVW MEDS BY RX/DR IN RCRD: CPT | Performed by: PHYSICIAN ASSISTANT

## 2024-03-20 PROCEDURE — 99024 POSTOP FOLLOW-UP VISIT: CPT | Performed by: PHYSICIAN ASSISTANT

## 2024-03-20 PROCEDURE — 81003 URINALYSIS AUTO W/O SCOPE: CPT | Performed by: PHYSICIAN ASSISTANT

## 2024-03-20 PROCEDURE — 51798 US URINE CAPACITY MEASURE: CPT | Performed by: PHYSICIAN ASSISTANT

## 2024-03-20 PROCEDURE — 1159F MED LIST DOCD IN RCRD: CPT | Performed by: PHYSICIAN ASSISTANT

## 2024-03-20 NOTE — PROGRESS NOTES
Subjective:   Kaden Osborne is a 68 year old male who presents for post op check following Cystoscopy, Holmium laser lithotripsy of bladder stones with stones removal, transurethral resection of the prostate 2/22/24 with Dr. Roy. He was discharged 2/23/24 with richard catheter and it was removed 3/4/24.      He had been performing CIC prior to surgery due to urinary retention, has not completed since TURP. Feels voiding well with strong stream. On finasteride and tamsulosin.     PVR today 14mL.         History/Other:    Chief Complaint Reviewed and Verified  Nursing Notes Reviewed and   Verified  Allergies Reviewed  Medications Reviewed         Current Outpatient Medications   Medication Sig Dispense Refill    Bethanechol Chloride 50 MG Oral Tab Take 1 tablet (50 mg total) by mouth in the morning and 1 tablet (50 mg total) before bedtime.      HYDROcodone-acetaminophen (NORCO) 5-325 MG Oral Tab Take 1 tablet by mouth every 6 (six) hours as needed for Pain. 30 tablet 0    fluticasone-salmeterol (WIXELA INHUB) 250-50 MCG/ACT Inhalation Aerosol Powder, Breath Activated Inhale 1 puff into the lungs Q12H. 180 each 0    dilTIAZem HCl ER Beads 120 MG Oral Capsule SR 24 Hr Take 1 capsule (120 mg total) by mouth daily.      tamsulosin 0.4 MG Oral Cap Take 2 capsules (0.8 mg total) by mouth every evening. 180 capsule 5    finasteride 5 MG Oral Tab Take 1 tablet (5 mg total) by mouth daily. 90 tablet 6    losartan Potassium 50 MG Oral Tab Take 1 tablet (50 mg total) by mouth daily.      Albuterol Sulfate  (90 Base) MCG/ACT Inhalation Aero Soln Inhale 2 puffs into the lungs every 6 (six) hours as needed for Wheezing or Shortness of Breath. 1 Inhaler 2       Review of Systems:  Pertinent items are noted in HPI.      Objective:   There were no vitals taken for this visit. Estimated body mass index is 22.88 kg/m² as calculated from the following:    Height as of 2/22/24: 5' 11\" (1.803 m).    Weight as of 2/22/24:  164 lb 0.4 oz (74.4 kg).    No distress  Non labored respirations    Laboratory Data:  Lab Results   Component Value Date    WBC 6.2 06/28/2023    HGB 14.0 06/28/2023    .0 06/28/2023     Lab Results   Component Value Date     02/05/2024    K 4.1 02/05/2024     02/05/2024    CO2 30.0 02/05/2024    BUN 15 02/05/2024    GLU 84 02/05/2024    GFRAA 88 07/05/2022    AST 27 06/28/2023    ALT 32 06/28/2023    TP 7.3 06/28/2023    ALB 4.2 06/28/2023    CA 9.9 02/05/2024       Urinalysis Results (last three years):  Recent Labs     08/06/21  1334 10/02/23  0945 03/20/24  0845   COLORUR Yellow  --   --    CLARITY Clear  --   --    SPECGRAVITY 1.013 1.025 1.030   PHURINE 6.0 6.0 5.5   PROUR Negative  --   --    GLUUR Negative  --   --    KETUR Negative  --   --    BILUR Negative  --   --    BLOODURINE Small*  --   --    NITRITE Negative Negative Positive*   UROBILINOGEN <2.0  --   --    LEUUR Negative  --   --    WBCUR 6-10*  --   --    RBCUR >10*  --   --    BACUR None Seen  --   --        Urine Culture Results (last three years):  No results found for: \"URINECUL\"    Imaging  No results found.    Assessment & Plan:   BPH with urinary retention  S/p cystoscopy, Holmium laser lithotripsy of bladder stones with stones removal, transurethral resection of the prostate 2/22/24, path negative  Obrien removed 3/4/24  Voiding freely, PVR 14mL.  Continue current medications, resume CIC if needed  Restrictions reviewed, may return to work     Abnormal urinalysis - send for culture.    Follow up as scheduled.  Future Appointments   Date Time Provider Department Center   6/24/2024 12:00 PM Faisal Roy MD [id number]           Tanisha Rodriguez PA-C, 3/20/2024

## 2024-03-22 ENCOUNTER — APPOINTMENT (OUTPATIENT)
Dept: PHYSICAL THERAPY | Facility: HOSPITAL | Age: 69
End: 2024-03-22
Attending: UROLOGY
Payer: MEDICARE

## 2024-03-29 ENCOUNTER — APPOINTMENT (OUTPATIENT)
Dept: PHYSICAL THERAPY | Facility: HOSPITAL | Age: 69
End: 2024-03-29
Attending: UROLOGY
Payer: MEDICARE

## 2024-04-05 ENCOUNTER — APPOINTMENT (OUTPATIENT)
Dept: PHYSICAL THERAPY | Facility: HOSPITAL | Age: 69
End: 2024-04-05
Attending: UROLOGY
Payer: MEDICARE

## 2024-04-12 ENCOUNTER — APPOINTMENT (OUTPATIENT)
Dept: PHYSICAL THERAPY | Facility: HOSPITAL | Age: 69
End: 2024-04-12
Attending: UROLOGY
Payer: MEDICARE

## 2024-04-19 ENCOUNTER — APPOINTMENT (OUTPATIENT)
Dept: PHYSICAL THERAPY | Facility: HOSPITAL | Age: 69
End: 2024-04-19
Attending: UROLOGY
Payer: MEDICARE

## 2024-04-24 DIAGNOSIS — J45.30 MILD PERSISTENT ASTHMA WITHOUT COMPLICATION (HCC): ICD-10-CM

## 2024-04-26 ENCOUNTER — APPOINTMENT (OUTPATIENT)
Dept: PHYSICAL THERAPY | Facility: HOSPITAL | Age: 69
End: 2024-04-26
Attending: UROLOGY
Payer: MEDICARE

## 2024-04-26 RX ORDER — FLUTICASONE PROPIONATE AND SALMETEROL 250; 50 UG/1; UG/1
1 POWDER RESPIRATORY (INHALATION) EVERY 12 HOURS
Qty: 180 EACH | Refills: 0 | Status: SHIPPED | OUTPATIENT
Start: 2024-04-26

## 2024-05-03 ENCOUNTER — APPOINTMENT (OUTPATIENT)
Dept: PHYSICAL THERAPY | Facility: HOSPITAL | Age: 69
End: 2024-05-03
Attending: UROLOGY
Payer: MEDICARE

## 2024-06-17 NOTE — PROGRESS NOTES
HPI:     Kaden Osborne is a 68 year old male with a PMH of HTN, asthma, Raynaud's.    Following for:  1. Elevated PSA  2. BPH/LUTS + insensate retention + bladder stones  - incidentally noted to have > 1 L in bladder 10/2/23  - 0.8 mg flomax and proscar 10/2/23. Started flomax 2021 and not sure if this helps  - on 3 now 4 x per day CIC since 10/2/23  - Cysto 12/5/23: mild-mod BPH with mod ARNOLDO. Three large (~ 1.5 cm) bladder stones. No other abnormalities  - s/p TURP 2/22/24: ~ 30 g, BPH. 2.5 cm bladder stones aggregate  3. Kidney stones  - possibly passed one in 2021, no procedures  4. Retention with urinary and fecal incontinence when he has to urinate and during runs  - on 50 mg bethanechol BID  - was on TID CIC prior to TURP  - UDS 12/5/23: first sensation 32 mL, first desire 462 mL, strong desire 650 mL, max bladder capacity of 730 mL. No leakage with valsalva. The patient was not able to void with max detrusor pressure of 8 cm H2O but he didn't really try to void as he was worried about fecal incontinence and 813 mL PVR.    PCP - YOSEPH PALACIOS 12/5/2023    Presents for check-up.  He has not needed to perform CIC since TURP.  He is taking 0.8 mg flomax and proscar and 50 mg bethanechol BID. Feels 100 x better since TURP but holding back on fluid intake d/t mild dribbling.    AUA SS is 9/35 with 3 f; 2 n, w; 1 s, u. Was 17/35 prior to CIC with 4 n, u, f; 2 w; 1 s, I, WILLIAM. Mostly happy with LUTS.  Urinary incontinence: mild SHARA/dribbles with straining with BM or full bladder BMs are better too. Prior to CIC was reporting yes, urge (and probably overflow incontinence) and changing underwear 2-3 x per day  Stool incontinence: resolved - prior to TURP noted maybe once a day with a full bladder  Abd exam LOV: large, palpable bladder on exam  Penoscrotal LOV: no abnormalities  TISHA LOV: > 50 g prostate, no nodules or tenderness     UA is negative and PVR is 11 - was 14 mL post-TURP and > 1 L during initial  visit.     Moderate ED. Currently masturbates without issue with RGE. Prefers observation.     Prior PSAs:  - 4.43 6/28/23  - 3.99 7/5/22  - 3.37 9/27/21  - 3.60 9/24/20  - 3.72 9/23/19  - 3.35 10/12/18  - < 3 y prior     UTI hx: none  Gross hematuria: none  Tobacco hx: none  Fam h/o  malignancy: none    Reported no water, 20 oz baca, 20 milk, 36 soda with clear urine. Now ~ 40 oz water, 20 baca, 20 milk 40 soda with medium yellow urine. I encouraged the pt drink at least 40-60 oz water per day or enough to keep urine clear to pale yellow for UTI and stone prevention.     CT SP 11/28/23: three large (10-16 mm) bladder stones, 3 RMP, BPH with BWT  CT AP 1/24/21: 1 mm distal left ureteral calc, BPH with marked bladder distention    We discussed Kegel exercises for incontinence. I provided and reviewed educational materials for this. Also recommend pelvic floor therapy for bladder/bowel dysfunction/    Will stop bethanechol for retention. Continue flomax, proscar for BPH/LUTS. PFT referral placed.  He will continue increased water intake for stone prevention. PSA check and F/u in 6 mo with PVR. Consider repeat PSA at that time.    Prior note:    He is performing 3 times daily CIC with 300-900 mL out with caths. He is not voiding during the day. He feels much better since starting CIC. Appetite and energy are good.    Discussed that UDS would indicate poor detrusor activity and poor sensation but patient apparently did not try to void as he was worried about leaking stool so we don't actually know if he has detrusor activity.    Discussed we could repeat UDS and have him try to void if he wants more info prior to making a decision. Discussed TURP risks and benefits and possible SEs as well as typical recovery course.    We also discussed his PSA was also elevated so would be good idea to sample prostate either with biopsy or we would be doing this during TURP anyways.    We discussed option to try bethanechol to see  if this helps with bladder contractility. We reviewed rationale and SEs to this.    We discussed that we should definitely treat the stones as these will serve as nidus for infection/hematuria and may obstruct the urethra. He would have option to proceed with TURP at the same time as stone removal.    After discussing all these options in detail he wants to continue flomax, proscar for BPH. Increase CIC frequency to 4-5 x daily CIC and titrate CIC to keep values < 500 mL. He will call to schedule PFT for urinary and bowel incontinence and will consider seeing GI as well for bowel incontinence. Starting bethanechol to see if this helps with bladder contractility. OR for TURP with bladder stones removal.     I spent over 40 minutes in consultation and coordination of this patient's care today including review of pertinent labs, imaging, records with > 50% of time spent counseling - not including time spent performing cystoscopy.      Past Medical History:    Aortic aneurysm (HCC)    Arrhythmia    Asthma (HCC)    Bicuspid aortic valve (HCC)    Hearing impairment    decreased hearing does  not wear any hearing aids    Hearing loss    Heart valve disease    bicuspid aortic valve    High blood pressure    Raynaud's syndrome    Unspecified essential hypertension    Visual impairment    glasses    Wears glasses      Past Surgical History:   Procedure Laterality Date    Colonoscopy      Tonsillectomy        Family History   Problem Relation Age of Onset    Heart Attack Father     Hypertension Father     Cancer Mother         Colon/Liver    Colon Cancer Mother     Breast Cancer Sister     Breast Cancer Sister     Cancer Other         family hx, Colon      Social History:   Social History     Socioeconomic History    Marital status:    Tobacco Use    Smoking status: Never     Passive exposure: Never    Smokeless tobacco: Never   Vaping Use    Vaping status: Never Used   Substance and Sexual Activity    Alcohol use: No     Drug use: No   Other Topics Concern    Caffeine Concern Yes     Comment: pop 2-4 daily/ tea    Exercise Yes     Comment: 4-5 x a week      Social Determinants of Health     Financial Resource Strain: Low Risk  (2/27/2024)    Financial Resource Strain     Difficulty of Paying Living Expenses: Not hard at all     Med Affordability: No   Food Insecurity: No Food Insecurity (2/22/2024)    Food Insecurity     Food Insecurity: Never true   Transportation Needs: No Transportation Needs (2/22/2024)    Transportation Needs     Lack of Transportation: No   Physical Activity: Inactive (1/13/2020)    [place] [facility]    Exercise Vital Sign     Days of Exercise per Week: 0 days     Minutes of Exercise per Session: 0 min   Housing Stability: Low Risk  (2/22/2024)    Housing Stability     Housing Instability: No        Medications (Active prior to today's visit):  Current Outpatient Medications   Medication Sig Dispense Refill    Bethanechol Chloride 50 MG Oral Tab Take 1 tablet (50 mg total) by mouth in the morning and 1 tablet (50 mg total) before bedtime. 60 tablet 0    tamsulosin 0.4 MG Oral Cap Take 2 capsules (0.8 mg total) by mouth every evening. 60 capsule 0    finasteride 5 MG Oral Tab Take 1 tablet (5 mg total) by mouth daily. 30 tablet 0    fluticasone-salmeterol (WIXELA INHUB) 250-50 MCG/ACT Inhalation Aerosol Powder, Breath Activated Inhale 1 puff into the lungs Q12H. 180 each 0    HYDROcodone-acetaminophen (NORCO) 5-325 MG Oral Tab Take 1 tablet by mouth every 6 (six) hours as needed for Pain. 30 tablet 0    dilTIAZem HCl ER Beads 120 MG Oral Capsule SR 24 Hr Take 1 capsule (120 mg total) by mouth daily.      losartan Potassium 50 MG Oral Tab Take 1 tablet (50 mg total) by mouth daily.      Albuterol Sulfate  (90 Base) MCG/ACT Inhalation Aero Soln Inhale 2 puffs into the lungs every 6 (six) hours as needed for Wheezing or Shortness of Breath. 1 Inhaler 2        Allergies:  No Known Allergies      ROS:     A comprehensive 10 point review of systems was completed.  Pertinent positives and negatives noted in the the HPI.    PHYSICAL EXAM:     GENERAL APPEARANCE: well, developed, well nourished, in no acute distress  NEUROLOGIC: nonfocal, alert and oriented  HEAD: normocephalic, atraumatic  EYES: sclera non-icteric  EARS: hearing intact  ORAL CAVITY: mucosa moist  NECK/THYROID: no obvious goiter or masses  LUNGS: nonlabored breathing  ABDOMEN: soft, no obvious masses or tenderness  SKIN: no obvious rashes    : as noted above    ASSESSMENT/PLAN:   Diagnoses and all orders for this visit:    BPH with obstruction/lower urinary tract symptoms  -     URINALYSIS, AUTO, W/O SCOPE    Urinary retention  -     URINALYSIS, AUTO, W/O SCOPE    Bladder stones    Overflow incontinence  -     SPECIALTY (OTHER) - EXTERNAL  -     PELVIC FLOOR THERAPY - INTERNAL    Elevated PSA  -     PSA Total, Diagnostic; Future    Full incontinence of feces  -     SPECIALTY (OTHER) - EXTERNAL  -     PELVIC FLOOR THERAPY - INTERNAL    - as noted above.    Thanks again for this consult.    Faisal Roy MD, FACS  Urologist  [facility]  Office: [phone]

## 2024-06-24 ENCOUNTER — OFFICE VISIT (OUTPATIENT)
Dept: SURGERY | Facility: CLINIC | Age: 69
End: 2024-06-24

## 2024-06-24 ENCOUNTER — LAB ENCOUNTER (OUTPATIENT)
Dept: LAB | Facility: HOSPITAL | Age: 69
End: 2024-06-24
Attending: UROLOGY

## 2024-06-24 DIAGNOSIS — R97.20 ELEVATED PSA: ICD-10-CM

## 2024-06-24 DIAGNOSIS — N40.1 BPH WITH OBSTRUCTION/LOWER URINARY TRACT SYMPTOMS: Primary | ICD-10-CM

## 2024-06-24 DIAGNOSIS — R15.9 FULL INCONTINENCE OF FECES: ICD-10-CM

## 2024-06-24 DIAGNOSIS — N39.490 OVERFLOW INCONTINENCE: ICD-10-CM

## 2024-06-24 DIAGNOSIS — N21.0 BLADDER STONES: ICD-10-CM

## 2024-06-24 DIAGNOSIS — N13.8 BPH WITH OBSTRUCTION/LOWER URINARY TRACT SYMPTOMS: Primary | ICD-10-CM

## 2024-06-24 DIAGNOSIS — R33.9 URINARY RETENTION: ICD-10-CM

## 2024-06-24 LAB
APPEARANCE: CLEAR
BILIRUBIN: NEGATIVE
GLUCOSE (URINE DIPSTICK): NEGATIVE MG/DL
MULTISTIX LOT#: ABNORMAL NUMERIC
NITRITE, URINE: NEGATIVE
OCCULT BLOOD: NEGATIVE
PH, URINE: 7 (ref 4.5–8)
PROTEIN (URINE DIPSTICK): NEGATIVE MG/DL
PSA SERPL-MCNC: 0.35 NG/ML (ref ?–4)
SPECIFIC GRAVITY: 1.02 (ref 1–1.03)
URINE-COLOR: YELLOW
UROBILINOGEN,SEMI-QN: 1 MG/DL (ref 0–1.9)

## 2024-06-24 PROCEDURE — 81003 URINALYSIS AUTO W/O SCOPE: CPT | Performed by: UROLOGY

## 2024-06-24 PROCEDURE — 1159F MED LIST DOCD IN RCRD: CPT | Performed by: UROLOGY

## 2024-06-24 PROCEDURE — 99214 OFFICE O/P EST MOD 30 MIN: CPT | Performed by: UROLOGY

## 2024-06-24 PROCEDURE — 36415 COLL VENOUS BLD VENIPUNCTURE: CPT

## 2024-06-24 PROCEDURE — 84153 ASSAY OF PSA TOTAL: CPT

## 2024-06-24 PROCEDURE — 1160F RVW MEDS BY RX/DR IN RCRD: CPT | Performed by: UROLOGY

## 2024-06-24 NOTE — PATIENT INSTRUCTIONS
1. Increase water intake to 40-60 ounces (2 liters) per day or enough to keep urine clear to pale yellow.  2. Continue tamsulosin, finasteride  3. Stop bethanechol  4. Recommend pelvic floor physical therapy

## 2024-07-07 DIAGNOSIS — N40.1 BPH WITH OBSTRUCTION/LOWER URINARY TRACT SYMPTOMS: ICD-10-CM

## 2024-07-07 DIAGNOSIS — N13.8 BPH WITH OBSTRUCTION/LOWER URINARY TRACT SYMPTOMS: ICD-10-CM

## 2024-07-10 RX ORDER — FINASTERIDE 5 MG/1
5 TABLET, FILM COATED ORAL DAILY
Qty: 30 TABLET | Refills: 0 | Status: SHIPPED | OUTPATIENT
Start: 2024-07-10

## 2024-07-23 DIAGNOSIS — J45.30 MILD PERSISTENT ASTHMA WITHOUT COMPLICATION (HCC): ICD-10-CM

## 2024-07-23 DIAGNOSIS — I10 HYPERTENSION, BENIGN: ICD-10-CM

## 2024-07-23 DIAGNOSIS — I47.10 SVT (SUPRAVENTRICULAR TACHYCARDIA) (HCC): Primary | ICD-10-CM

## 2024-07-24 NOTE — TELEPHONE ENCOUNTER
Did not pass protocol  Last office visit 10/20/2023  Last refill was: , _4/26/2024  _tabs  Next appointment:  none scheduled    Please sign pended medication if appropriate     Medication Quantity Refills Start End   fluticasone-salmeterol (WIXELA INHUB) 250-50 MCG/ACT Inhalation Aerosol Powder, Breath Activated 180 each 0 4/26/2024 --   Sig:   Inhale 1 puff into the lungs Q12H.     Route:   Inhalation

## 2024-07-25 RX ORDER — FLUTICASONE PROPIONATE AND SALMETEROL 250; 50 UG/1; UG/1
1 POWDER RESPIRATORY (INHALATION) EVERY 12 HOURS
Qty: 180 EACH | Refills: 0 | OUTPATIENT
Start: 2024-07-25

## 2024-08-26 ENCOUNTER — TELEPHONE (OUTPATIENT)
Dept: FAMILY MEDICINE CLINIC | Facility: CLINIC | Age: 69
End: 2024-08-26

## 2024-08-26 DIAGNOSIS — I77.810 DILATED AORTIC ROOT (HCC): Primary | ICD-10-CM

## 2024-08-26 DIAGNOSIS — I10 ESSENTIAL HYPERTENSION: ICD-10-CM

## 2024-08-26 NOTE — TELEPHONE ENCOUNTER
Patient labs are pending and patient would like to get labs done this coming up week before appointment.

## 2024-08-28 RX ORDER — FLUTICASONE PROPIONATE AND SALMETEROL 250; 50 UG/1; UG/1
1 POWDER RESPIRATORY (INHALATION) EVERY 12 HOURS
Qty: 180 EACH | Refills: 0 | Status: SHIPPED | OUTPATIENT
Start: 2024-08-28

## 2024-09-12 ENCOUNTER — OFFICE VISIT (OUTPATIENT)
Dept: FAMILY MEDICINE CLINIC | Facility: CLINIC | Age: 69
End: 2024-09-12
Payer: MEDICARE

## 2024-09-12 ENCOUNTER — LAB ENCOUNTER (OUTPATIENT)
Dept: LAB | Age: 69
End: 2024-09-12
Attending: FAMILY MEDICINE
Payer: MEDICARE

## 2024-09-12 VITALS
WEIGHT: 161 LBS | TEMPERATURE: 97 F | DIASTOLIC BLOOD PRESSURE: 62 MMHG | OXYGEN SATURATION: 98 % | BODY MASS INDEX: 22.54 KG/M2 | SYSTOLIC BLOOD PRESSURE: 100 MMHG | HEART RATE: 70 BPM | HEIGHT: 70.98 IN | RESPIRATION RATE: 16 BRPM

## 2024-09-12 DIAGNOSIS — R35.1 BPH ASSOCIATED WITH NOCTURIA: ICD-10-CM

## 2024-09-12 DIAGNOSIS — I47.10 SVT (SUPRAVENTRICULAR TACHYCARDIA) (HCC): ICD-10-CM

## 2024-09-12 DIAGNOSIS — Q23.1 BICUSPID AORTIC VALVE: ICD-10-CM

## 2024-09-12 DIAGNOSIS — I10 ESSENTIAL HYPERTENSION: ICD-10-CM

## 2024-09-12 DIAGNOSIS — I71.21 ANEURYSM OF ASCENDING AORTA WITHOUT RUPTURE (HCC): ICD-10-CM

## 2024-09-12 DIAGNOSIS — Z00.00 MEDICARE ANNUAL WELLNESS VISIT, SUBSEQUENT: Primary | ICD-10-CM

## 2024-09-12 DIAGNOSIS — J45.30 MILD PERSISTENT ASTHMA WITHOUT COMPLICATION (HCC): ICD-10-CM

## 2024-09-12 DIAGNOSIS — I77.810 DILATED AORTIC ROOT (HCC): ICD-10-CM

## 2024-09-12 DIAGNOSIS — I10 HYPERTENSION, BENIGN: ICD-10-CM

## 2024-09-12 DIAGNOSIS — N40.1 BPH ASSOCIATED WITH NOCTURIA: ICD-10-CM

## 2024-09-12 LAB
ALBUMIN SERPL-MCNC: 4.4 G/DL (ref 3.2–4.8)
ALBUMIN/GLOB SERPL: 1.5 {RATIO} (ref 1–2)
ALP LIVER SERPL-CCNC: 79 U/L
ALT SERPL-CCNC: 22 U/L
ANION GAP SERPL CALC-SCNC: 8 MMOL/L (ref 0–18)
AST SERPL-CCNC: 23 U/L (ref ?–34)
BASOPHILS # BLD AUTO: 0.07 X10(3) UL (ref 0–0.2)
BASOPHILS NFR BLD AUTO: 0.8 %
BILIRUB SERPL-MCNC: 0.6 MG/DL (ref 0.2–1.1)
BUN BLD-MCNC: 16 MG/DL (ref 9–23)
CALCIUM BLD-MCNC: 9.9 MG/DL (ref 8.7–10.4)
CHLORIDE SERPL-SCNC: 105 MMOL/L (ref 98–112)
CHOLEST SERPL-MCNC: 139 MG/DL (ref ?–200)
CO2 SERPL-SCNC: 28 MMOL/L (ref 21–32)
CREAT BLD-MCNC: 1.04 MG/DL
EGFRCR SERPLBLD CKD-EPI 2021: 78 ML/MIN/1.73M2 (ref 60–?)
EOSINOPHIL # BLD AUTO: 0.83 X10(3) UL (ref 0–0.7)
EOSINOPHIL NFR BLD AUTO: 9.2 %
ERYTHROCYTE [DISTWIDTH] IN BLOOD BY AUTOMATED COUNT: 13.7 %
FASTING PATIENT LIPID ANSWER: YES
FASTING STATUS PATIENT QL REPORTED: YES
GLOBULIN PLAS-MCNC: 3 G/DL (ref 2–3.5)
GLUCOSE BLD-MCNC: 86 MG/DL (ref 70–99)
HCT VFR BLD AUTO: 45.4 %
HDLC SERPL-MCNC: 68 MG/DL (ref 40–59)
HGB BLD-MCNC: 15.2 G/DL
IMM GRANULOCYTES # BLD AUTO: 0.04 X10(3) UL (ref 0–1)
IMM GRANULOCYTES NFR BLD: 0.4 %
LDLC SERPL CALC-MCNC: 63 MG/DL (ref ?–100)
LYMPHOCYTES # BLD AUTO: 2.13 X10(3) UL (ref 1–4)
LYMPHOCYTES NFR BLD AUTO: 23.5 %
MCH RBC QN AUTO: 30.5 PG (ref 26–34)
MCHC RBC AUTO-ENTMCNC: 33.5 G/DL (ref 31–37)
MCV RBC AUTO: 91.2 FL
MONOCYTES # BLD AUTO: 0.69 X10(3) UL (ref 0.1–1)
MONOCYTES NFR BLD AUTO: 7.6 %
NEUTROPHILS # BLD AUTO: 5.31 X10 (3) UL (ref 1.5–7.7)
NEUTROPHILS # BLD AUTO: 5.31 X10(3) UL (ref 1.5–7.7)
NEUTROPHILS NFR BLD AUTO: 58.5 %
NONHDLC SERPL-MCNC: 71 MG/DL (ref ?–130)
OSMOLALITY SERPL CALC.SUM OF ELEC: 292 MOSM/KG (ref 275–295)
PLATELET # BLD AUTO: 157 10(3)UL (ref 150–450)
POTASSIUM SERPL-SCNC: 4.5 MMOL/L (ref 3.5–5.1)
PROT SERPL-MCNC: 7.4 G/DL (ref 5.7–8.2)
RBC # BLD AUTO: 4.98 X10(6)UL
SODIUM SERPL-SCNC: 141 MMOL/L (ref 136–145)
TRIGL SERPL-MCNC: 30 MG/DL (ref 30–149)
VLDLC SERPL CALC-MCNC: 4 MG/DL (ref 0–30)
WBC # BLD AUTO: 9.1 X10(3) UL (ref 4–11)

## 2024-09-12 PROCEDURE — 80061 LIPID PANEL: CPT

## 2024-09-12 PROCEDURE — 80053 COMPREHEN METABOLIC PANEL: CPT

## 2024-09-12 PROCEDURE — 85025 COMPLETE CBC W/AUTO DIFF WBC: CPT

## 2024-09-12 NOTE — PROGRESS NOTES
Subjective:   Kaden Osborne is a 68 year old male who presents for a MA AHA (Medicare Advantage Annual Health Assessment) and Medicare Subsequent Annual Wellness visit (Pt already had Initial Annual Wellness) and scheduled follow up of multiple significant but stable problems.       Patient has a past medical history of hypertension BPH SVT aneurysm of the ascending aorta and bicuspid aortic valve asthma.  The patient has been following up with cardiology Dr. John.  The patient has follow-up in December.  States that he has been doing well since his prostate surgery.  States that his urinary flow has been going well he is continue to use Flomax denies any acute concerns today denies any need for refills today.        History/Other:   Fall Risk Assessment:   He has been screened for Falls and is low risk.      Cognitive Assessment:   He had a completely normal cognitive assessment - see flowsheet entries     Functional Ability/Status:   Kaden Osborne has some abnormal functions as listed below:  He has difficulties Affording Meds based on screening of functional status.       Depression Screening (PHQ):  PHQ-2 SCORE: 0  , done 9/12/2024   Last [place] Suicide Screening on 9/12/2024 was No Risk.       Advanced Directives:   He does NOT have a Living Will. [Do you have a living will?: No]  He does have a POA but we do NOT have it on file in Epic.    Discussed Advance Care Planning with patient (and family/surrogate if present). Standard forms made available to patient in After Visit Summary.      Patient Active Problem List   Diagnosis    Raynaud's syndrome    Essential hypertension    Family history of colon cancer    Mild persistent asthma without complication (HCC)    Hearing loss    Bicuspid aortic valve (HCC)    Eczema    SVT (supraventricular tachycardia) (HCC)    Thoracic ascending aortic aneurysm (HCC)     Allergies:  He has No Known Allergies.    Current Medications:  Outpatient Medications  Marked as Taking for the 9/12/24 encounter (Office Visit) with Onur Mclean MD   Medication Sig    fluticasone-salmeterol (WIXELA INHUB) 250-50 MCG/ACT Inhalation Aerosol Powder, Breath Activated Inhale 1 puff into the lungs Q12H.    FINASTERIDE 5 MG Oral Tab TAKE 1 TABLET(5 MG) BY MOUTH DAILY    Bethanechol Chloride 50 MG Oral Tab Take 1 tablet (50 mg total) by mouth in the morning and 1 tablet (50 mg total) before bedtime.    dilTIAZem HCl ER Beads 120 MG Oral Capsule SR 24 Hr Take 1 capsule (120 mg total) by mouth daily.    losartan Potassium 50 MG Oral Tab Take 1 tablet (50 mg total) by mouth daily.    Albuterol Sulfate  (90 Base) MCG/ACT Inhalation Aero Soln Inhale 2 puffs into the lungs every 6 (six) hours as needed for Wheezing or Shortness of Breath.       Medical History:  He  has a past medical history of Aortic aneurysm (HCC), Arrhythmia, Asthma (HCC), Bicuspid aortic valve (HCC), Hearing impairment, Hearing loss, Heart valve disease, High blood pressure, Paroxysmal supraventricular tachycardia (HCC) (10/17/2018), Raynaud's syndrome, Unspecified essential hypertension, Visual impairment, and Wears glasses.  Surgical History:  He  has a past surgical history that includes tonsillectomy and colonoscopy.   Family History:  His family history includes Breast Cancer in his sister and sister; Cancer in his mother and another family member; Colon Cancer in his mother; Heart Attack in his father; Hypertension in his father.  Social History:  He  reports that he has never smoked. He has never been exposed to tobacco smoke. He has never used smokeless tobacco. He reports that he does not drink alcohol and does not use drugs.    Tobacco:  He has never smoked tobacco.    CAGE Alcohol Screen:   CAGE screening score of 0 on 9/12/2024, showing low risk of alcohol abuse.      Patient Care Team:  Onur Mclean MD as PCP - General  Nick Lindo MD (Cardiovascular Diseases)    Review of  Systems  Consitutional: No fevers, chills, sweats  Eye: No recent visual problems  ENMT: No ear pain nasal congestion sore throat  Respiratory: No shortness of breath, cough  Cardiovascular: No chest pain palpitations syncope  Gastrointestinal: No nausea vomiting diarrhea  Genitourinary: No hematuria  Hema/Lymph no bruising tendency, swollen lymph glands  Endocrine: Negative for excessive thirst excessive hunger  Musculoskeletal: No back pain neck pain joint pain muscle pain decreased range of motion  Integumentary: No rash, pruritus, abrasions  Neurologic: Alert and oriented x4  Psychiatric: No anxiety, depression    Medical, surgical, family, and social histories were reviewed    Objective:   Physical Exam    VITALS: Vitals reviewed   GENERAL: well developed, well nourished, in no apparent distress  SKIN: no rashes, no suspicious lesions: Cool and Dry  HEENT: atraumatic, normocephalic, ears and throat are clear bilateral tympanic membranes lucent nonbulging intact nose without bleeding purulent discharge or septal hematoma.    EYES: Pupils equal round and reactive.  Extraocular motions intact no scleral icterus no injection or drainage  THROAT without erythema tonsillar hypertrophy or exudate.  Uvula midline airway patent                Hearing Assessed via: Whispered Voice  NECK: trachea midline.  No JVD or lymphadenopathy supple nontender no meningeal signs   LUNGS: clear to auscultation sounds equal bilaterally no wheezes rales or rhonchi  CARDIO: Regular rate and rhythm without murmurs gallops or rubs  GI: Soft nontender nondistended no hepatomegaly palpable masses.  No guarding.   without deformity or crepitus no flank tenderness  EXTREMITIES: no cyanosis, clubbing or edema no joint tenderness effusion or edema noted.  No calf tenderness negative Homans' sign bilaterally  NEURO: Awake and alert.  Cranial nerves II through XII intact motor and sensory grossly within normal limits.  5 out of 5 muscle  strength in all muscle groups.  Normal speech.  PSYCHIATRIC: Awake, alert and oriented x3, cooperative appropriate mood and affect.  Judgment intact          /62   Pulse 70   Temp 97 °F (36.1 °C)   Resp 16   Ht 5' 10.98\" (1.803 m)   Wt 161 lb (73 kg)   SpO2 98%   BMI 22.47 kg/m²  Estimated body mass index is 22.47 kg/m² as calculated from the following:    Height as of this encounter: 5' 10.98\" (1.803 m).    Weight as of this encounter: 161 lb (73 kg).    Medicare Hearing Assessment:   Hearing Screening    Time taken: 9/12/2024  8:12 AM  Screening Method: Finger Rub  Finger Rub Result: Pass         Visual Acuity:   Right Eye Visual Acuity: Corrected Right Eye Chart Acuity: 20/40   Left Eye Visual Acuity: Corrected Left Eye Chart Acuity: 20/40   Both Eyes Visual Acuity: Corrected Both Eyes Chart Acuity: 20/40   Able To Tolerate Visual Acuity: Yes        Assessment & Plan:   Kaden Osborne is a 68 year old male who presents for a Medicare Assessment.     1. Medicare annual wellness visit, subsequent (Primary)    I did discuss that the patient was suggested to update his blood work.  Need a CMP as well as lipid panel.  Was asked to update his immunizations COVID-vaccine later on this month as well as flu vaccine in October.  He is up-to-date with all other preventative measures at this time was asked to follow-up yearly.        2. Essential hypertension    Patient's blood pressure is well-controlled at this time he is currently on diltiazem as well as losartan was asked to continue these medications as prescribed.      3. BPH associated with nocturia      Patient is taking finasteride he states that his urinary flow has been improved since after his surgery he has been following up with urology was asked to continue to take the medication as well as to follow-up with urology.      4. SVT (supraventricular tachycardia) (HCC)  5. Aneurysm of ascending aorta without rupture (HCC)    Patient does  follow-up with Dr. John as well as Dr. Lindo at this time he does have a follow-up later on this year.  Has not had any heart palpitations denies any chest pain or any other concerns today.        6. Bicuspid aortic valve (HCC)  7. Mild persistent asthma without complication (HCC)    Patient is currently on Wixela as well as albuterol inhaler.  He states that he has not had any episodes of shortness of breath has not needed to use his rescue inhaler does not recall the last time he needed it.  He was asked to continue to take the Wixela as prescribed.        The patient presented today for Medicare annual wellness visit.  During the course of today's visit the health risk assessment past medical family and social histories were updated and reviewed with the patient.  The patient was educated and counseled about appropriate screening and preventative services and these were ordered as appropriate.  Referrals for educational and counseling services were made where indicated.  Advance directives were discussed.  A copy of the recommended preventative screenings as well as discharge instructions were provided to the patient.  End-of-life planning was discussed advanced directives were also discussed and are in place.           The patient indicates understanding of these issues and agrees to the plan.  Lab work ordered.  Patient reassured.  Reinforced healthy diet, lifestyle, and exercise.      No follow-ups on file.     Onur Mclean MD, 9/12/2024     Supplementary Documentation:   General Health:  In the past six months, have you lost more than 10 pounds without trying?: 2 - No  Has your appetite been poor?: No  Type of Diet: Balanced  How does the patient maintain a good energy level?: Daily Walks  How would you describe your daily physical activity?: Moderate  How would you describe your current health state?: Good  How do you maintain positive mental well-being?: Social Interaction  On a scale of 0 to 10, with  0 being no pain and 10 being severe pain, what is your pain level?: 0 - (None)  In the past six months, have you experienced urine leakage?: 0-No  At any time do you feel concerned for the safety/well-being of yourself and/or your children, in your home or elsewhere?: No  Have you had any immunizations at another office such as Influenza, Hepatitis B, Tetanus, or Pneumococcal?: No    Health Maintenance   Topic Date Due    Zoster Vaccines (1 of 2) Never done    Colorectal Cancer Screening  01/12/2023    MA Annual Health Assessment  01/01/2024    COVID-19 Vaccine (2 - 2023-24 season) 09/01/2024    Influenza Vaccine (1) 10/01/2024    PSA  06/24/2026    Annual Depression Screening  Completed    Fall Risk Screening (Annual)  Completed    Pneumococcal Vaccine: 65+ Years  Completed

## 2025-01-17 DIAGNOSIS — J45.30 MILD PERSISTENT ASTHMA WITHOUT COMPLICATION (HCC): ICD-10-CM

## 2025-01-20 RX ORDER — FLUTICASONE PROPIONATE AND SALMETEROL 250; 50 UG/1; UG/1
1 POWDER RESPIRATORY (INHALATION) EVERY 12 HOURS
Qty: 180 EACH | Refills: 0 | Status: SHIPPED | OUTPATIENT
Start: 2025-01-20

## 2025-01-20 NOTE — TELEPHONE ENCOUNTER
Last Office Visit: 09/12/2024    Last Refill:   Medication Quantity Refills Start End   fluticasone-salmeterol (WIXELA INHUB) 250-50 MCG/ACT Inhalation Aerosol Powder, Breath Activated 180 each 0 8/28/2024 --     Return to Clinic: 09/12/2025    Protocol: failed    Refill pended. Please approve if okay. Thank you.

## 2025-01-30 DIAGNOSIS — N13.8 BPH WITH OBSTRUCTION/LOWER URINARY TRACT SYMPTOMS: ICD-10-CM

## 2025-01-30 DIAGNOSIS — J45.30 MILD PERSISTENT ASTHMA WITHOUT COMPLICATION (HCC): ICD-10-CM

## 2025-01-30 DIAGNOSIS — N40.1 BPH WITH OBSTRUCTION/LOWER URINARY TRACT SYMPTOMS: ICD-10-CM

## 2025-01-31 RX ORDER — FLUTICASONE PROPIONATE AND SALMETEROL 250; 50 UG/1; UG/1
1 POWDER RESPIRATORY (INHALATION) EVERY 12 HOURS
Qty: 180 EACH | Refills: 0 | OUTPATIENT
Start: 2025-01-31

## 2025-02-04 RX ORDER — FINASTERIDE 5 MG/1
5 TABLET, FILM COATED ORAL DAILY
Qty: 90 TABLET | Refills: 0 | Status: SHIPPED | OUTPATIENT
Start: 2025-02-04

## 2025-02-06 DIAGNOSIS — J45.30 MILD PERSISTENT ASTHMA WITHOUT COMPLICATION (HCC): ICD-10-CM

## 2025-02-08 DIAGNOSIS — J45.30 MILD PERSISTENT ASTHMA WITHOUT COMPLICATION (HCC): ICD-10-CM

## 2025-02-11 RX ORDER — FLUTICASONE PROPIONATE AND SALMETEROL 250; 50 UG/1; UG/1
1 POWDER RESPIRATORY (INHALATION) EVERY 12 HOURS
Qty: 180 EACH | Refills: 0 | OUTPATIENT
Start: 2025-02-11

## 2025-02-11 RX ORDER — FLUTICASONE PROPIONATE AND SALMETEROL 250; 50 UG/1; UG/1
1 POWDER RESPIRATORY (INHALATION) EVERY 12 HOURS
Qty: 180 EACH | Refills: 0 | Status: SHIPPED | OUTPATIENT
Start: 2025-02-11

## 2025-02-11 NOTE — TELEPHONE ENCOUNTER
Called pharmacy and was informed that they do not have the medication. Medication will need to be resent.

## 2025-03-10 ENCOUNTER — TELEPHONE (OUTPATIENT)
Age: 70
End: 2025-03-10

## 2025-03-10 NOTE — TELEPHONE ENCOUNTER
L/m to pt asking to please remove Dr Woodruff as pcp on his ins and assign his pcp Dr Vinay Mohr.

## 2025-03-10 NOTE — TELEPHONE ENCOUNTER
Pt called back and was told needs to call his insurance and change pcp. Pt stated he will give them a call.

## 2025-04-15 ENCOUNTER — PATIENT MESSAGE (OUTPATIENT)
Dept: FAMILY MEDICINE CLINIC | Facility: CLINIC | Age: 70
End: 2025-04-15

## 2025-05-02 DIAGNOSIS — N13.8 BPH WITH OBSTRUCTION/LOWER URINARY TRACT SYMPTOMS: ICD-10-CM

## 2025-05-02 DIAGNOSIS — N40.1 BPH WITH OBSTRUCTION/LOWER URINARY TRACT SYMPTOMS: ICD-10-CM

## 2025-05-06 RX ORDER — FINASTERIDE 5 MG/1
5 TABLET, FILM COATED ORAL DAILY
Qty: 90 TABLET | Refills: 0 | Status: SHIPPED | OUTPATIENT
Start: 2025-05-06 | End: 2025-05-09

## 2025-05-09 ENCOUNTER — OFFICE VISIT (OUTPATIENT)
Dept: FAMILY MEDICINE CLINIC | Facility: CLINIC | Age: 70
End: 2025-05-09
Payer: MEDICARE

## 2025-05-09 VITALS
BODY MASS INDEX: 23.38 KG/M2 | HEIGHT: 71 IN | WEIGHT: 167 LBS | DIASTOLIC BLOOD PRESSURE: 62 MMHG | HEART RATE: 70 BPM | RESPIRATION RATE: 16 BRPM | TEMPERATURE: 97 F | SYSTOLIC BLOOD PRESSURE: 112 MMHG

## 2025-05-09 DIAGNOSIS — J45.30 MILD PERSISTENT ASTHMA WITHOUT COMPLICATION (HCC): ICD-10-CM

## 2025-05-09 DIAGNOSIS — N13.8 BPH WITH OBSTRUCTION/LOWER URINARY TRACT SYMPTOMS: ICD-10-CM

## 2025-05-09 DIAGNOSIS — N40.1 BPH WITH OBSTRUCTION/LOWER URINARY TRACT SYMPTOMS: ICD-10-CM

## 2025-05-09 DIAGNOSIS — L30.9 CHRONIC ECZEMA: ICD-10-CM

## 2025-05-09 DIAGNOSIS — I10 ESSENTIAL HYPERTENSION: ICD-10-CM

## 2025-05-09 DIAGNOSIS — Z12.11 SCREEN FOR COLON CANCER: ICD-10-CM

## 2025-05-09 PROCEDURE — 3078F DIAST BP <80 MM HG: CPT | Performed by: FAMILY MEDICINE

## 2025-05-09 PROCEDURE — 3074F SYST BP LT 130 MM HG: CPT | Performed by: FAMILY MEDICINE

## 2025-05-09 PROCEDURE — G2211 COMPLEX E/M VISIT ADD ON: HCPCS | Performed by: FAMILY MEDICINE

## 2025-05-09 PROCEDURE — 1160F RVW MEDS BY RX/DR IN RCRD: CPT | Performed by: FAMILY MEDICINE

## 2025-05-09 PROCEDURE — 3008F BODY MASS INDEX DOCD: CPT | Performed by: FAMILY MEDICINE

## 2025-05-09 PROCEDURE — 99214 OFFICE O/P EST MOD 30 MIN: CPT | Performed by: FAMILY MEDICINE

## 2025-05-09 PROCEDURE — 1159F MED LIST DOCD IN RCRD: CPT | Performed by: FAMILY MEDICINE

## 2025-05-09 RX ORDER — ALBUTEROL SULFATE 90 UG/1
2 INHALANT RESPIRATORY (INHALATION) EVERY 6 HOURS PRN
Qty: 1 EACH | Refills: 2 | Status: SHIPPED | OUTPATIENT
Start: 2025-05-09

## 2025-05-09 RX ORDER — PREDNISONE 50 MG/1
50 TABLET ORAL DAILY
Qty: 7 TABLET | Refills: 0 | Status: SHIPPED | OUTPATIENT
Start: 2025-05-09 | End: 2025-05-16

## 2025-05-09 RX ORDER — DILTIAZEM HYDROCHLORIDE 120 MG/1
120 CAPSULE, EXTENDED RELEASE ORAL DAILY
Qty: 90 CAPSULE | Refills: 0 | Status: SHIPPED | OUTPATIENT
Start: 2025-05-09

## 2025-05-09 RX ORDER — FLUTICASONE PROPIONATE AND SALMETEROL 250; 50 UG/1; UG/1
1 POWDER RESPIRATORY (INHALATION) EVERY 12 HOURS
Qty: 180 EACH | Refills: 0 | Status: SHIPPED | OUTPATIENT
Start: 2025-05-09

## 2025-05-09 RX ORDER — FINASTERIDE 5 MG/1
5 TABLET, FILM COATED ORAL DAILY
Qty: 90 TABLET | Refills: 0 | Status: SHIPPED | OUTPATIENT
Start: 2025-05-09

## 2025-05-09 RX ORDER — LOSARTAN POTASSIUM 50 MG/1
50 TABLET ORAL DAILY
Qty: 90 TABLET | Refills: 0 | Status: SHIPPED | OUTPATIENT
Start: 2025-05-09

## 2025-05-09 RX ORDER — TRIAMCINOLONE ACETONIDE 1 MG/G
CREAM TOPICAL
Qty: 453.4 G | Refills: 0 | Status: SHIPPED | OUTPATIENT
Start: 2025-05-09

## 2025-05-09 RX ORDER — HYDROXYZINE HYDROCHLORIDE 25 MG/1
25 TABLET, FILM COATED ORAL 3 TIMES DAILY PRN
Qty: 90 TABLET | Refills: 0 | Status: SHIPPED | OUTPATIENT
Start: 2025-05-09

## 2025-05-09 NOTE — PROGRESS NOTES
[facility] Office Note  Chief Complaint:   Chief Complaint   Patient presents with    Eczema     Pt c/o Eczema all over his body.    Knee Pain     Pt c/o 75 % right knee pain    Leg Pain     Right leg pain    Hypertension       HPI:   This is a 69 year old male coming in for increased episodes of itching as well as dry patches of skin all over his body.  The patient has a history of eczema.  He states that has been getting worse within the last couple weeks.  States he does need some refills with his hydroxyzine as well as triamcinolone.  He does have a history of hypertension BPH asthma states that his inhalers have been working well has not had any increased episodes of shortness of breath.      Past Medical History[1]  Past Surgical History[2]  Social History:  Short Social Hx on File[3]  Family History:  Family History[4]  Allergies:  Allergies[5]  Current Meds:  Current Medications[6]   Counseling given: Not Answered       REVIEW OF SYSTEMS:   Consitutional: No fevers, chills, sweats  Eye: No recent visual problems  ENMT: No ear pain nasal congestion sore throat  Respiratory: No shortness of breath, cough  Cardiovascular: No chest pain palpitations syncope  Gastrointestinal: No nausea vomiting diarrhea  Genitourinary: No hematuria  Hema/Lymph no bruising tendency, swollen lymph glands  Endocrine: Negative for excessive thirst excessive hunger  Musculoskeletal: No back pain neck pain joint pain muscle pain decreased range of motion  Integumentary: positive rash, pruritus,       Medical, surgical, family, and social histories were reviewed      EXAM:   VITALS:   Vitals:    05/09/25 0845   BP: 112/62   Pulse: 70   Resp: 16   Temp: 96.9 °F (36.1 °C)      GENERAL: well developed, well nourished, in no apparent distress  SKIN: no rashes, no suspicious lesions: Cool and Dry  HEENT: atraumatic, normocephalic, ears and throat are clear    Ears: TM's clear and visible bilaterally, no excess  cerumen or erythema.   EYES: Pupils equal round and reactive.  Extraocular motions intact no scleral icterus no injection or drainage  THROAT without erythema tonsillar hypertrophy or exudate.  Uvula midline airway patent  NECK: Given midline.  No JVD or lymphadenopathy supple nontender no meningeal signs   LUNGS: clear to auscultation sounds equal bilaterally no wheezes rales or rhonchi  CARDIO: Regular rate and rhythm without murmurs gallops or rubs      ASSESSMENT AND PLAN:   1. Chronic eczema  - triamcinolone 0.1 % External Cream; Then use as needed for eczema flare  Dispense: 453.4 g; Refill: 0  - hydrOXYzine 25 MG Oral Tab; Take 1 tablet (25 mg total) by mouth 3 (three) times daily as needed for Itching.  Dispense: 90 tablet; Refill: 0  - predniSONE 50 MG Oral Tab; Take 1 tablet (50 mg total) by mouth daily for 7 days.  Dispense: 7 tablet; Refill: 0  I did discuss with the patient at this time I would suggest using prednisone once a day for the next 7 days to help with the eczema exacerbation.  He was asked to continue with the triamcinolone as well as hydroxyzine as needed.  2. Essential hypertension  - dilTIAZem HCl ER Beads 120 MG Oral Capsule SR 24 Hr; Take 1 capsule (120 mg total) by mouth daily.  Dispense: 90 capsule; Refill: 0  - losartan 50 MG Oral Tab; Take 1 tablet (50 mg total) by mouth daily.  Dispense: 90 tablet; Refill: 0  Patient's blood pressure has been well-controlled he is currently on diltiazem as well as losartan he was asked to continue with the medications he was given refills today.  3. BPH with obstruction/lower urinary tract symptoms  - finasteride 5 MG Oral Tab; Take 1 tablet (5 mg total) by mouth daily.  Dispense: 90 tablet; Refill: 0    4. Mild persistent asthma without complication (HCC)  - albuterol 108 (90 Base) MCG/ACT Inhalation Aero Soln; Inhale 2 puffs into the lungs every 6 (six) hours as needed for Wheezing or Shortness of Breath.  Dispense: 1 each; Refill: 2  -  fluticasone-salmeterol (WIXELA INHUB) 250-50 MCG/ACT Inhalation Aerosol Powder, Breath Activated; Inhale 1 puff into the lungs in the morning and 1 puff in the evening.  Dispense: 180 each; Refill: 0  I did discuss with the patient to continue with the albuterol as well as Wixela he has been doing well has not had any increased shortness of breath episodes.  5. Screen for colon cancer  - [facility]astro Ref[facility]       Meds & Refills for this Visit:  Requested Prescriptions     Signed Prescriptions Disp Refills    triamcinolone 0.1 % External Cream 453.4 g 0     Sig: Then use as needed for eczema flare    albuterol 108 (90 Base) MCG/ACT Inhalation Aero Soln 1 each 2     Sig: Inhale 2 puffs into the lungs every 6 (six) hours as needed for Wheezing or Shortness of Breath.    finasteride 5 MG Oral Tab 90 tablet 0     Sig: Take 1 tablet (5 mg total) by mouth daily.    fluticasone-salmeterol (WIXELA INHUB) 250-50 MCG/ACT Inhalation Aerosol Powder, Breath Activated 180 each 0     Sig: Inhale 1 puff into the lungs in the morning and 1 puff in the evening.    dilTIAZem HCl ER Beads 120 MG Oral Capsule SR 24 Hr 90 capsule 0     Sig: Take 1 capsule (120 mg total) by mouth daily.    losartan 50 MG Oral Tab 90 tablet 0     Sig: Take 1 tablet (50 mg total) by mouth daily.    hydrOXYzine 25 MG Oral Tab 90 tablet 0     Sig: Take 1 tablet (25 mg total) by mouth 3 (three) times daily as needed for Itching.    predniSONE 50 MG Oral Tab 7 tablet 0     Sig: Take 1 tablet (50 mg total) by mouth daily for 7 days.       Health Maintenance:  Health Maintenance Due   Topic Date Due    Zoster Vaccines (1 of 2) Never done    Colorectal Cancer Screening  01/12/2023    COVID-19 Vaccine (2 - 2024-25 season) 09/01/2024    Annual Well Visit  01/01/2025       Patient/Caregiver Education: Patient/Caregiver Education: There are no barriers to learning. Medical education done.   Outcome: Patient verbalizes understanding. Patient is notified to call  with any questions, complications, allergies, or worsening or changing symptoms.  Patient is to call with any side effects or complications from the treatments as a result of today.     Problem List:  Problem List[7]      No follow-ups on file.     Onur Mclean MD    Please note that portions of this note may have been completed with a voice recognition program. Efforts were made to edit the dictations but occasionally words are mis-transcribed.       [1]   Past Medical History:   Aortic aneurysm    Arrhythmia    Asthma (HCC)    Bicuspid aortic valve (HCC)    Hearing impairment    decreased hearing does  not wear any hearing aids    Hearing loss    Heart valve disease    bicuspid aortic valve    High blood pressure    Paroxysmal supraventricular tachycardia (HCC)    Raynaud's syndrome    Unspecified essential hypertension    Visual impairment    glasses    Wears glasses   [2]   Past Surgical History:  Procedure Laterality Date    Colonoscopy      Tonsillectomy     [3]   Social History  Socioeconomic History    Marital status:    Tobacco Use    Smoking status: Never     Passive exposure: Never    Smokeless tobacco: Never   Vaping Use    Vaping status: Never Used   Substance and Sexual Activity    Alcohol use: No    Drug use: No   Other Topics Concern    Caffeine Concern Yes     Comment: pop 2-4 daily/ tea    Exercise Yes     Comment: 4-5 x a week      Social Drivers of Health     Food Insecurity: No Food Insecurity (2/22/2024)    Food Insecurity     Food Insecurity: Never true   Transportation Needs: No Transportation Needs (2/22/2024)    Transportation Needs     Lack of Transportation: No   Housing Stability: Low Risk  (2/22/2024)    Housing Stability     Housing Instability: No   [4]   Family History  Problem Relation Age of Onset    Heart Attack Father     Hypertension Father     Cancer Mother         Colon/Liver    Colon Cancer Mother     Breast Cancer Sister     Breast Cancer Sister     Cancer Other          family hx, Colon   [5] No Known Allergies  [6]   Current Outpatient Medications   Medication Sig Dispense Refill    triamcinolone 0.1 % External Cream Then use as needed for eczema flare 453.4 g 0    albuterol 108 (90 Base) MCG/ACT Inhalation Aero Soln Inhale 2 puffs into the lungs every 6 (six) hours as needed for Wheezing or Shortness of Breath. 1 each 2    finasteride 5 MG Oral Tab Take 1 tablet (5 mg total) by mouth daily. 90 tablet 0    fluticasone-salmeterol (WIXELA INHUB) 250-50 MCG/ACT Inhalation Aerosol Powder, Breath Activated Inhale 1 puff into the lungs in the morning and 1 puff in the evening. 180 each 0    dilTIAZem HCl ER Beads 120 MG Oral Capsule SR 24 Hr Take 1 capsule (120 mg total) by mouth daily. 90 capsule 0    losartan 50 MG Oral Tab Take 1 tablet (50 mg total) by mouth daily. 90 tablet 0    hydrOXYzine 25 MG Oral Tab Take 1 tablet (25 mg total) by mouth 3 (three) times daily as needed for Itching. 90 tablet 0    predniSONE 50 MG Oral Tab Take 1 tablet (50 mg total) by mouth daily for 7 days. 7 tablet 0    tamsulosin 0.4 MG Oral Cap Take 2 capsules (0.8 mg total) by mouth every evening. 60 capsule 0    Bethanechol Chloride 50 MG Oral Tab Take 1 tablet (50 mg total) by mouth in the morning and 1 tablet (50 mg total) before bedtime. 60 tablet 0    HYDROcodone-acetaminophen (NORCO) 5-325 MG Oral Tab Take 1 tablet by mouth every 6 (six) hours as needed for Pain. (Patient not taking: Reported on 9/12/2024) 30 tablet 0   [7]   Patient Active Problem List  Diagnosis    Raynaud's syndrome    Essential hypertension    Family history of colon cancer    Mild persistent asthma without complication (HCC)    Hearing loss    Bicuspid aortic valve    Eczema    SVT (supraventricular tachycardia) (HCC)    Thoracic ascending aortic aneurysm

## 2025-05-12 RX ORDER — FLUTICASONE PROPIONATE AND SALMETEROL 250; 50 UG/1; UG/1
1 POWDER RESPIRATORY (INHALATION) EVERY 12 HOURS
Qty: 180 EACH | Refills: 0 | OUTPATIENT
Start: 2025-05-12

## 2025-05-29 ENCOUNTER — HOSPITAL ENCOUNTER (OUTPATIENT)
Dept: GENERAL RADIOLOGY | Age: 70
Discharge: HOME | End: 2025-05-29
Attending: FAMILY MEDICINE
Payer: MEDICARE

## 2025-05-29 ENCOUNTER — OFFICE VISIT (OUTPATIENT)
Dept: FAMILY MEDICINE CLINIC | Facility: CLINIC | Age: 70
End: 2025-05-29
Payer: MEDICARE

## 2025-05-29 VITALS
BODY MASS INDEX: 23.38 KG/M2 | TEMPERATURE: 98 F | WEIGHT: 167 LBS | HEIGHT: 71 IN | DIASTOLIC BLOOD PRESSURE: 58 MMHG | SYSTOLIC BLOOD PRESSURE: 112 MMHG | HEART RATE: 68 BPM | RESPIRATION RATE: 18 BRPM

## 2025-05-29 DIAGNOSIS — M54.31 SCIATICA OF RIGHT SIDE: ICD-10-CM

## 2025-05-29 DIAGNOSIS — M54.31 SCIATICA OF RIGHT SIDE: Primary | ICD-10-CM

## 2025-05-29 PROCEDURE — 3074F SYST BP LT 130 MM HG: CPT | Performed by: FAMILY MEDICINE

## 2025-05-29 PROCEDURE — 72110 X-RAY EXAM L-2 SPINE 4/>VWS: CPT | Performed by: FAMILY MEDICINE

## 2025-05-29 PROCEDURE — 3008F BODY MASS INDEX DOCD: CPT | Performed by: FAMILY MEDICINE

## 2025-05-29 PROCEDURE — G2211 COMPLEX E/M VISIT ADD ON: HCPCS | Performed by: FAMILY MEDICINE

## 2025-05-29 PROCEDURE — 3078F DIAST BP <80 MM HG: CPT | Performed by: FAMILY MEDICINE

## 2025-05-29 PROCEDURE — 1159F MED LIST DOCD IN RCRD: CPT | Performed by: FAMILY MEDICINE

## 2025-05-29 PROCEDURE — 99214 OFFICE O/P EST MOD 30 MIN: CPT | Performed by: FAMILY MEDICINE

## 2025-05-29 PROCEDURE — 1160F RVW MEDS BY RX/DR IN RCRD: CPT | Performed by: FAMILY MEDICINE

## 2025-05-29 RX ORDER — NAPROXEN 500 MG/1
500 TABLET ORAL 2 TIMES DAILY WITH MEALS
Qty: 14 TABLET | Refills: 0 | Status: SHIPPED | OUTPATIENT
Start: 2025-05-29 | End: 2025-06-05

## 2025-05-29 RX ORDER — PREDNISONE 50 MG/1
50 TABLET ORAL DAILY
Qty: 7 TABLET | Refills: 0 | Status: SHIPPED | OUTPATIENT
Start: 2025-05-29 | End: 2025-06-05

## 2025-05-29 NOTE — PROGRESS NOTES
[facility] Office Note  Chief Complaint:   Chief Complaint   Patient presents with    Low Back Pain    Leg Pain     Pt has hx of Sciatic inflammation 5 year ago.       HPI:   This is a 69 year old male coming in for right-sided lower back pain.  The patient states that this started a month ago.  He has not had any trauma or injury denies any numbness or tingling of any extremity denies any urinary symptoms.      Past Medical History[1]  Past Surgical History[2]  Social History:  Short Social Hx on File[3]  Family History:  Family History[4]  Allergies:  Allergies[5]  Current Meds:  Current Medications[6]   Counseling given: Not Answered       REVIEW OF SYSTEMS:   Consitutional: No fevers, chills, sweats  Eye: No recent visual problems  ENMT: No ear pain nasal congestion sore throat  Respiratory: No shortness of breath, cough  Cardiovascular: No chest pain palpitations syncope  Gastrointestinal: No nausea vomiting diarrhea  Genitourinary: No hematuria  Hema/Lymph no bruising tendency, swollen lymph glands  Endocrine: Negative for excessive thirst excessive hunger  Musculoskeletal: Positive back pain no neck pain joint pain muscle pain decreased range of motion      Medical, surgical, family, and social histories were reviewed      EXAM:     VITALS:   Vitals:    05/29/25 1155   BP: 112/58   Pulse: 68   Resp: 18   Temp: 97.9 °F (36.6 °C)      GENERAL: well developed, well nourished, in no apparent distress  SKIN: no rashes, no suspicious lesions: Cool and Dry  HEENT: atraumatic, normocephalic, ears and throat are clear    Ears: TM's clear and visible bilaterally, no excess cerumen or erythema.   EYES: Pupils equal round and reactive.  Extraocular motions intact no scleral icterus no injection or drainage  THROAT without erythema tonsillar hypertrophy or exudate.  Uvula midline airway patent  NECK: Given midline.  No JVD or lymphadenopathy supple nontender no meningeal signs   LUNGS: clear to  auscultation sounds equal bilaterally no wheezes rales or rhonchi  CARDIO: Regular rate and rhythm without murmurs gallops or rubs  GI: Soft nontender nondistended no hepatomegaly palpable masses.  No guarding.   without deformity or crepitus no flank tenderness  There is some pain to palpation of the right sacroiliac area straight leg raise negative    ASSESSMENT AND PLAN:   1. Sciatica of right side  - predniSONE 50 MG Oral Tab; Take 1 tablet (50 mg total) by mouth daily for 7 days.  Dispense: 7 tablet; Refill: 0  - naproxen 500 MG Oral Tab; Take 1 tablet (500 mg total) by mouth 2 (two) times daily with meals for 7 days.  Dispense: 14 tablet; Refill: 0  - XR LUMBAR SPINE (MIN 4 VIEWS) (CPT=72110); Future  Did discuss with the patient is likely sciatica he was given a prescription for prednisone to be used once a day for the next 7 days as well as naproxen twice a day was asked to have an x-ray completed as well.  He was asked to follow-up if he has any continued symptoms.    Meds & Refills for this Visit:  Requested Prescriptions     Signed Prescriptions Disp Refills    predniSONE 50 MG Oral Tab 7 tablet 0     Sig: Take 1 tablet (50 mg total) by mouth daily for 7 days.    naproxen 500 MG Oral Tab 14 tablet 0     Sig: Take 1 tablet (500 mg total) by mouth 2 (two) times daily with meals for 7 days.       Health Maintenance:  Health Maintenance Due   Topic Date Due    Zoster Vaccines (1 of 2) Never done    Colorectal Cancer Screening  01/12/2023    COVID-19 Vaccine (2 - 2024-25 season) 09/01/2024    Annual Well Visit  01/01/2025       Patient/Caregiver Education: Patient/Caregiver Education: There are no barriers to learning. Medical education done.   Outcome: Patient verbalizes understanding. Patient is notified to call with any questions, complications, allergies, or worsening or changing symptoms.  Patient is to call with any side effects or complications from the treatments as a result of today.      Problem List:  Problem List[7]      No follow-ups on file.     Onur Mclean MD    Please note that portions of this note may have been completed with a voice recognition program. Efforts were made to edit the dictations but occasionally words are mis-transcribed.       [1]   Past Medical History:   Aortic aneurysm    Arrhythmia    Asthma (HCC)    Bicuspid aortic valve    Hearing impairment    decreased hearing does  not wear any hearing aids    Hearing loss    Heart valve disease    bicuspid aortic valve    High blood pressure    Paroxysmal supraventricular tachycardia (HCC)    Raynaud's syndrome    Unspecified essential hypertension    Visual impairment    glasses    Wears glasses   [2]   Past Surgical History:  Procedure Laterality Date    Colonoscopy      Tonsillectomy     [3]   Social History  Socioeconomic History    Marital status:    Tobacco Use    Smoking status: Never     Passive exposure: Never    Smokeless tobacco: Never   Vaping Use    Vaping status: Never Used   Substance and Sexual Activity    Alcohol use: No    Drug use: No   Other Topics Concern    Caffeine Concern Yes     Comment: pop 2-4 daily/ tea    Exercise Yes     Comment: 4-5 x a week      Social Drivers of Health     Food Insecurity: No Food Insecurity (2/22/2024)    Food Insecurity     Food Insecurity: Never true   Transportation Needs: No Transportation Needs (2/22/2024)    Transportation Needs     Lack of Transportation: No   Housing Stability: Low Risk  (2/22/2024)    Housing Stability     Housing Instability: No   [4]   Family History  Problem Relation Age of Onset    Heart Attack Father     Hypertension Father     Cancer Mother         Colon/Liver    Colon Cancer Mother     Breast Cancer Sister     Breast Cancer Sister     Cancer Other         family hx, Colon   [5] No Known Allergies  [6]   Current Outpatient Medications   Medication Sig Dispense Refill    predniSONE 50 MG Oral Tab Take 1 tablet (50 mg total) by mouth  daily for 7 days. 7 tablet 0    naproxen 500 MG Oral Tab Take 1 tablet (500 mg total) by mouth 2 (two) times daily with meals for 7 days. 14 tablet 0    triamcinolone 0.1 % External Cream Then use as needed for eczema flare 453.4 g 0    albuterol 108 (90 Base) MCG/ACT Inhalation Aero Soln Inhale 2 puffs into the lungs every 6 (six) hours as needed for Wheezing or Shortness of Breath. 1 each 2    finasteride 5 MG Oral Tab Take 1 tablet (5 mg total) by mouth daily. 90 tablet 0    fluticasone-salmeterol (WIXELA INHUB) 250-50 MCG/ACT Inhalation Aerosol Powder, Breath Activated Inhale 1 puff into the lungs in the morning and 1 puff in the evening. 180 each 0    dilTIAZem HCl ER Beads 120 MG Oral Capsule SR 24 Hr Take 1 capsule (120 mg total) by mouth daily. 90 capsule 0    losartan 50 MG Oral Tab Take 1 tablet (50 mg total) by mouth daily. 90 tablet 0    hydrOXYzine 25 MG Oral Tab Take 1 tablet (25 mg total) by mouth 3 (three) times daily as needed for Itching. 90 tablet 0    Bethanechol Chloride 50 MG Oral Tab Take 1 tablet (50 mg total) by mouth in the morning and 1 tablet (50 mg total) before bedtime. 60 tablet 0    tamsulosin 0.4 MG Oral Cap Take 2 capsules (0.8 mg total) by mouth every evening. 60 capsule 0   [7]   Patient Active Problem List  Diagnosis    Raynaud's syndrome    Essential hypertension    Family history of colon cancer    Mild persistent asthma without complication (HCC)    Hearing loss    Bicuspid aortic valve    Eczema    SVT (supraventricular tachycardia) (HCC)    Thoracic ascending aortic aneurysm

## 2025-06-06 ENCOUNTER — PATIENT MESSAGE (OUTPATIENT)
Dept: FAMILY MEDICINE CLINIC | Facility: CLINIC | Age: 70
End: 2025-06-06

## 2025-06-06 DIAGNOSIS — L30.9 CHRONIC ECZEMA: ICD-10-CM

## 2025-06-06 NOTE — TELEPHONE ENCOUNTER
Patient asking about his sciatica in my chart message. He wonders if he should take a couple of weeks off. If you are in agreement he will need a note for work.

## 2025-06-10 RX ORDER — HYDROXYZINE HYDROCHLORIDE 25 MG/1
25 TABLET, FILM COATED ORAL 3 TIMES DAILY PRN
Qty: 90 TABLET | Refills: 0 | Status: SHIPPED | OUTPATIENT
Start: 2025-06-10

## 2025-06-10 NOTE — TELEPHONE ENCOUNTER
Requested Prescriptions     Pending Prescriptions Disp Refills    HYDROXYZINE 25 MG Oral Tab [Pharmacy Med Name: HYDROXYZINE HCL 25MG TABS (WHITE)] 90 tablet 0     Sig: TAKE 1 TABLET(25 MG) BY MOUTH THREE TIMES DAILY AS NEEDED FOR ITCHING     Last refill 5/9/25 #90  LOV 5/29/25  No future appointments.  RTC n/a

## 2025-06-11 ENCOUNTER — TELEPHONE (OUTPATIENT)
Dept: FAMILY MEDICINE CLINIC | Facility: CLINIC | Age: 70
End: 2025-06-11

## 2025-06-11 DIAGNOSIS — M54.31 SCIATICA OF RIGHT SIDE: Primary | ICD-10-CM

## 2025-06-11 NOTE — TELEPHONE ENCOUNTER
Patient was seen on 5/29/25 for right sided sciatica. He has tried all of the recommendations. He has strecthed, done at home exercises, took and completed the full courses of Prednisone and Naproxen, but he still continues to have the sciatica pain. Worse with walking and going up stairs.     Has trouble walking sometimes. Used to run a lot, but not running as much because he gets pain. Patient would like to know the next steps.     Note for nurses: If patient does not answer, please leave a detailed message.

## 2025-06-11 NOTE — TELEPHONE ENCOUNTER
He can have a follow-up with physical therapy to see if this helps if this does not improve can have him follow-up with orthopedics

## 2025-07-02 ENCOUNTER — TELEPHONE (OUTPATIENT)
Dept: PHYSICAL THERAPY | Facility: HOSPITAL | Age: 70
End: 2025-07-02

## 2025-07-08 DIAGNOSIS — M54.31 SCIATICA OF RIGHT SIDE: ICD-10-CM

## 2025-07-10 ENCOUNTER — OFFICE VISIT (OUTPATIENT)
Dept: PHYSICAL THERAPY | Facility: HOSPITAL | Age: 70
End: 2025-07-10
Attending: FAMILY MEDICINE
Payer: MEDICARE

## 2025-07-10 DIAGNOSIS — M54.31 SCIATICA OF RIGHT SIDE: Primary | ICD-10-CM

## 2025-07-10 PROCEDURE — 97161 PT EVAL LOW COMPLEX 20 MIN: CPT

## 2025-07-10 PROCEDURE — 97110 THERAPEUTIC EXERCISES: CPT

## 2025-07-10 NOTE — PROGRESS NOTES
SPINE EVALUATION:     Diagnosis:   Sciatica of right side (M54.31) Patient:  Kaden Osborne (69 year old, male)        Referring Provider: Onur Mclean  Today's Date   7/10/2025    Precautions:  Hearing Impairment   Date of Evaluation: 07/10/25  Next MD visit: as needed  Date of Surgery: n/a     PATIENT SUMMARY     Summary of chief complaints: R sided hip pain and medial R thigh pain  History of current condition: Has had back pain in years past but not as severe as this episode.. Over the last 6 weeks, back has gotten worse. Thinks he maybe overdid it with the running. Seems like it is not getting any better. Has tried Ibuprofen and tylenol which help temporarily. Has trouble driving, walking, going up stairs, sleeping (difficult to fall asleep due to the pain). Is not able to cut the grass without onset of pain. S1: hip pain (usually occurs while driving), S2: medial thigh pain (more related to movement). Lying down relieves both symptoms. Feels like the two symptoms are somewhat related. Hard to get up from sitting in lower chairs/seats. Running: 10-12 mi/week, occasionally will get up to 18/19 mi/week. Has occasional numbness in the feet, R>L.   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    Prior level of function: running ~15 mi/week, ADLs, mowing lawn  Current limitations: Standing for long periods of time, driving, running  Pt goals: get back to running, daily activities  Red flag signs/symptoms: Pt denies changes in bowel/bladder function, saddle anesthesia    Past medical history was reviewed with Kaden.  Significant findings include: aortic aneurysm  Imaging/Tests: Lumbar xray: CONCLUSION:  L1-2 and L4-5 degenerative disc disease with grade 1 degenerative spondylolisthesis at L4-5.  Lower lumbar facet arthropathy.   Kaden  has a  past medical history of Aortic aneurysm, Arrhythmia, Asthma (HCC), Bicuspid aortic valve, Hearing impairment, Hearing loss, Heart valve disease, High blood pressure, Paroxysmal supraventricular tachycardia (HCC) (10/17/2018), Raynaud's syndrome, Unspecified essential hypertension, Visual impairment, and Wears glasses.  He  has a past surgical history that includes tonsillectomy and colonoscopy.    ASSESSMENT  Kaden presents to physical therapy evaluation with primary c/o R sided hip pain and medial R thigh pain. The results of the objective tests and measures show + SLR on R, decreased lumbar ext, painful UPA on R L4-5, decreased hamstring length bilaterally, decreased hip strength on R.. Functional deficits include but are not limited to Standing for long periods of time, driving, running. Signs and symptoms are consistent with diagnosis of Sciatica of right side (M54.31). Pt and PT discussed evaluation findings, pathology, POC and HEP.  Pt voiced understanding and performs HEP correctly without reported pain. Skilled Physical Therapy is medically necessary to address the above impairments and reach functional goals.    OBJECTIVE:      Musculoskeletal:  Observation/Posture: forward head posture; stooped forward posture   Accessory Motion: Limited CPA L3-5, reproduction of R hip symptoms with R UPA L4-5     Special tests:   SLR: L: -, R: +     ROM and Strength:  (* denotes performed with pain)  Trunk ROM MMT (-/5)     Flex 29 cm from ground       Ext 0 deg      R L R L     Side bend 50 cm from ground *onset of thigh and hip symptoms 50 cm from ground         Rotation WNL WNL       ,   Hip   ROM MMT (-/5)    R L R L     Flex (L2)     5 5     Ext              Abd     4 5     ER     4+ 5     IR     4+ 5    ,   Myotome MMT   MMT (-/5)    R L   Hip Flex (L2) 5 5   Knee Ext (L3) 5 5   Ankle DF (L4) 5 5   Ankle PF (S1)       Grt Toe Ext (L5) 5 5       Flexibility:  LE Flexibility R L     Hip Flexor         Hamstrings mod  restricted mod restricted     ITB         Piriformis         Quads         Gastroc-soleus           Neurological:  Sensation: WFL except  slight diminshed sensation over R great toe  Deep Tendon Reflexes: grossly intact EFREN UE/LE          Peripheral Neurodynamic: WNL except     Balance and Functional Mobility:  Gait: pt ambulates on level ground with decreased step length; stooped posture/forward lean.  Balance: SLS: EO R  , EO L          Today's Treatment and Response:   Pt education was provided on exam findings, treatment diagnosis, treatment plan, expectations, and prognosis.    Today's Treatment       7/10/2025   Spine Treatment   Therapeutic Exercise - Hooklying Single Knee to Chest Stretch  - 1 x daily - 7 x weekly - 1 sets - 3 reps - 20 hold  - Seated Hamstring Stretch  - 1 x daily - 7 x weekly - 1 sets - 3 reps - 20 hold   Therapeutic Exercise Minutes 10   Evaluation Minutes 40   Total Time Of Timed Procedures 10   Total Time Of Service-Based Procedures 40   Total Treatment Time 50   HEP Access Code: [email or web address]  URL: [email or web address]  Date: 07/10/2025  Prepared by: Naye Brooks    Exercises  - Hooklying Single Knee to Chest Stretch  - 1 x daily - 7 x weekly - 1 sets - 3 reps - 20 hold  - Seated Hamstring Stretch  - 1 x daily - 7 x weekly - 1 sets - 3 reps - 20 hold        Patient was instructed in and issued a HEP for: Access Code: [email or web address]  URL: [email or web address]  Date: 07/10/2025  Prepared by: Naye Sisler    Exercises  - Hooklying Single Knee to Chest Stretch  - 1 x daily - 7 x weekly - 1 sets - 3 reps - 20 hold  - Seated Hamstring Stretch  - 1 x daily - 7 x weekly - 1 sets - 3 reps - 20 hold    Charges:  PT EVAL: Low Complexity, 1 TE  In agreement with evaluation findings and clinical rationale, this evaluation involved LOW COMPLEXITY decision making due to no personal factors/comorbidities, 1-2 body structures involved/activity limitations, and stable  symptoms as documented in the evaluation.                                                                         PLAN OF CARE:      Goals: (to be met in 10 visits)    Not Met Progress Toward Partially Met Met   Pt will improve transversus abdominis recruitment to perform proper isometric contraction without requiring verbal or tactile cuing to promote advancement of therex. [] [] [] []   Pt will demonstrate good understanding of proper posture and body mechanics to decrease pain and improve spinal safety. [] [] [] []   Pt will report improved symptom centralization and absence of radicular symptoms for 3 consecutive days to improve function with ADLs. [] [] [] []   Pt will have decreased paraspinal mm tension to tolerate standing >30 minutes for work and home activities. [] [] [] []   Pt will ascend 1 flight of stairs without increase in back pain <2/10 [] [] [] []   Pt will subjectively report running 3 mi without increase in back pain <2/10 [] [] [] []   Pt will be independent and compliant with comprehensive HEP to maintain progress achieved in PT [] [] [] []          Frequency / Duration: Patient will be seen 1-2x/week or a total of 10  visits over a 90 day period. Treatment will include: Gait training; Manual Therapy; Neuromuscular Re-education; Therapeutic Activities; Therapeutic Exercise; Home Exercise Program instruction    Education or treatment limitation: None   Rehab Potential: good     LEFS Score  LEFS Score: (Patient-Rptd) 36.25 % (7/1/2025  3:35 PM)      Patient/Family/Caregiver was advised of these findings, precautions, and treatment options and has agreed to actively participate in planning and for this course of care.    Thank you for your referral. Please co-sign or sign and return this letter via fax as soon as possible to [phone]. If you have any questions, please contact me at Dept: [phone]    Sincerely,  Electronically signed by therapist: Naye Brooks, PT  Physician's  certification required: Yes  I certify the need for these services furnished under this plan of treatment and while under my care.    X___________________________________________________ Date____________________    Certification From: 7/10/2025  To: 10/8/2025

## 2025-07-11 RX ORDER — NAPROXEN 500 MG/1
500 TABLET ORAL 2 TIMES DAILY WITH MEALS
Qty: 14 TABLET | Refills: 0 | Status: SHIPPED | OUTPATIENT
Start: 2025-07-11

## 2025-07-11 NOTE — TELEPHONE ENCOUNTER
Requested Prescriptions     Pending Prescriptions Disp Refills    NAPROXEN 500 MG Oral Tab [Pharmacy Med Name: NAPROXEN 500MG TABLETS] 14 tablet 0     Sig: TAKE 1 TABLET(500 MG) BY MOUTH TWICE DAILY WITH MEALS FOR 7 DAYS     Last refill 5/19/25 #14  LOV 5/29/25  Future Appointments   none                                     RTC n/a

## 2025-07-17 ENCOUNTER — OFFICE VISIT (OUTPATIENT)
Dept: PHYSICAL THERAPY | Facility: HOSPITAL | Age: 70
End: 2025-07-17
Attending: FAMILY MEDICINE
Payer: MEDICARE

## 2025-07-17 PROCEDURE — 97110 THERAPEUTIC EXERCISES: CPT

## 2025-07-17 PROCEDURE — 97140 MANUAL THERAPY 1/> REGIONS: CPT

## 2025-07-17 NOTE — PROGRESS NOTES
Patient: Kaden Osborne (69 year old, male) Referring Provider:  Insurance:   Diagnosis: Sciatica of right side (M54.31) Onur REYES   Date of Surgery: n/a Next MD visit:  N/A   Precautions:  Hearing Impairment as needed Referral Information:    Date of Evaluation: Req: 8, Auth: 8, Exp: 9/10/2026    07/10/25 POC Auth Visits:          Today's Date   7/17/2025    Subjective  Sometimes feels great after the exercises and sometimes notices no change       Pain: pain not reported     Objective  None taken today.          Assessment  Pt presents for first follow up visit after initial evaluation. incorporated manual therapy to address mobility limitations in lower back. Introduced glute strengthening exercises with good tolerance. Pt education provided on lumbar roll while driving and verbalized good understanding.    Goals (to be met in 10 visits)      Not Met Progress Toward Partially Met Met   Pt will improve transversus abdominis recruitment to perform proper isometric contraction without requiring verbal or tactile cuing to promote advancement of therex. [] [] [] []   Pt will demonstrate good understanding of proper posture and body mechanics to decrease pain and improve spinal safety. [] [] [] []   Pt will report improved symptom centralization and absence of radicular symptoms for 3 consecutive days to improve function with ADLs. [] [] [] []   Pt will have decreased paraspinal mm tension to tolerate standing >30 minutes for work and home activities. [] [] [] []   Pt will ascend 1 flight of stairs without increase in back pain <2/10 [] [] [] []   Pt will subjectively report running 3 mi without increase in back pain <2/10 [] [] [] []   Pt will be independent and compliant with comprehensive HEP to maintain progress achieved in PT [] [] [] []              Plan  Continue with POC for addressing lumbar mobility and strengthening.    Treatment Last 4 Visits  Treatment Day: 2       7/10/2025  7/17/2025   Spine Treatment   Therapeutic Exercise - Hooklying Single Knee to Chest Stretch  - 1 x daily - 7 x weekly - 1 sets - 3 reps - 20 hold  - Seated Hamstring Stretch  - 1 x daily - 7 x weekly - 1 sets - 3 reps - 20 hold LTR x 10   SB DKTC x 15   Supine sciatic nerve glides 2 x 10   RTB clamshell 2 x 10 R/L   Bridges x 10 SB bridges x 12     Education on trialing lumbar roll when driving      Manual Therapy  CPA to lower lumbar spine gr III, multiple bouts   UPA on R to lower lumbar spine gr III, multiple bouts   R LAD 10 sec x 10    Therapeutic Exercise Minutes 10 26   Manual Therapy Minutes  18   Evaluation Minutes 40    Total Time Of Timed Procedures 10 44   Total Time Of Service-Based Procedures 40 0   Total Treatment Time 50 44   HEP Access Code: [email or web address]  URL: [email or web address]  Date: 07/10/2025  Prepared by: Naye Brooks    Exercises  - Hooklying Single Knee to Chest Stretch  - 1 x daily - 7 x weekly - 1 sets - 3 reps - 20 hold  - Seated Hamstring Stretch  - 1 x daily - 7 x weekly - 1 sets - 3 reps - 20 hold         HEP  Access Code: [email or web address]  URL: [email or web address]  Date: 07/10/2025  Prepared by: Naye Brooks    Exercises  - Hooklying Single Knee to Chest Stretch  - 1 x daily - 7 x weekly - 1 sets - 3 reps - 20 hold  - Seated Hamstring Stretch  - 1 x daily - 7 x weekly - 1 sets - 3 reps - 20 hold    Charges  1 Man, 2 TE

## 2025-07-21 ENCOUNTER — APPOINTMENT (OUTPATIENT)
Dept: PHYSICAL THERAPY | Facility: HOSPITAL | Age: 70
End: 2025-07-21
Attending: FAMILY MEDICINE
Payer: MEDICARE

## 2025-07-21 ENCOUNTER — TELEPHONE (OUTPATIENT)
Dept: PHYSICAL THERAPY | Facility: HOSPITAL | Age: 70
End: 2025-07-21

## 2025-07-24 ENCOUNTER — OFFICE VISIT (OUTPATIENT)
Dept: PHYSICAL THERAPY | Facility: HOSPITAL | Age: 70
End: 2025-07-24
Attending: FAMILY MEDICINE
Payer: MEDICARE

## 2025-07-24 PROCEDURE — 97140 MANUAL THERAPY 1/> REGIONS: CPT

## 2025-07-24 PROCEDURE — 97110 THERAPEUTIC EXERCISES: CPT

## 2025-07-24 NOTE — PROGRESS NOTES
Patient: Kaden Osborne (69 year old, male) Referring Provider:  Insurance:   Diagnosis: Sciatica of right side (M54.31) Onur REYES   Date of Surgery: n/a Next MD visit:  N/A   Precautions:  Hearing Impairment as needed Referral Information:    Date of Evaluation: Req: 8, Auth: 8, Exp: 9/10/2026    07/10/25 POC Auth Visits:          Today's Date   7/24/2025    Subjective  Feels stiff with most activities. Tried walking a mile which felt okay, but was not able to run. Went up and down stairs 8 times in a row and had back and thigh pain       Pain: 3/10     Objective  Lumbar flexion: 32 cm from ground pain free, lumbar extension: 5 deg*thigh pain and back pain          Assessment  Pt continues to report radicular symptoms, worsened with extension based activities. Continued with mobility exercises with freqeuent verbal, visual, and tactile cueing required for cat cow exercise. Pt instructed to trial seated flexion stretch when experiencing symptoms at home.    Goals (to be met in 10 visits)      Not Met Progress Toward Partially Met Met   Pt will improve transversus abdominis recruitment to perform proper isometric contraction without requiring verbal or tactile cuing to promote advancement of therex. [] [] [] []   Pt will demonstrate good understanding of proper posture and body mechanics to decrease pain and improve spinal safety. [] [] [] []   Pt will report improved symptom centralization and absence of radicular symptoms for 3 consecutive days to improve function with ADLs. [] [] [] []   Pt will have decreased paraspinal mm tension to tolerate standing >30 minutes for work and home activities. [] [] [] []   Pt will ascend 1 flight of stairs without increase in back pain <2/10 [] [] [] []   Pt will subjectively report running 3 mi without increase in back pain <2/10 [] [] [] []   Pt will be independent and compliant with comprehensive HEP to maintain progress achieved in PT [] [] [] []                   Plan  Continue with POC for addressing lumbar mobility and strengthening. Plan for next therapy: standing hip extension    Treatment Last 4 Visits  Treatment Day: 3       7/10/2025 7/17/2025 7/24/2025   Spine Treatment   Therapeutic Exercise - Hooklying Single Knee to Chest Stretch  - 1 x daily - 7 x weekly - 1 sets - 3 reps - 20 hold  - Seated Hamstring Stretch  - 1 x daily - 7 x weekly - 1 sets - 3 reps - 20 hold LTR x 10   SB DKTC x 15   Supine sciatic nerve glides 2 x 10   RTB clamshell 2 x 10 R/L   Bridges x 10 SB bridges x 12     Education on trialing lumbar roll when driving    LTR x 10   SB DKTC x 15   Supine sciatic nerve glides 2 x 10   Hooklying TrA ball press 2 x 10   Cat cow against wall x 10    Manual Therapy  CPA to lower lumbar spine gr III, multiple bouts   UPA on R to lower lumbar spine gr III, multiple bouts   R LAD 10 sec x 10  R LAD, multiple bouts    Therapeutic Exercise Minutes 10 26 35   Manual Therapy Minutes  18 12   Evaluation Minutes 40     Total Time Of Timed Procedures 10 44 47   Total Time Of Service-Based Procedures 40 0 0   Total Treatment Time 50 44 47   HEP Access Code: [email or web address]  URL: [email or web address]  Date: 07/10/2025  Prepared by: Naye Brooks    Exercises  - Hooklying Single Knee to Chest Stretch  - 1 x daily - 7 x weekly - 1 sets - 3 reps - 20 hold  - Seated Hamstring Stretch  - 1 x daily - 7 x weekly - 1 sets - 3 reps - 20 hold          HEP  Access Code: [email or web address]  URL: [email or web address]  Date: 07/10/2025  Prepared by: Naye Brooks    Exercises  - Hooklying Single Knee to Chest Stretch  - 1 x daily - 7 x weekly - 1 sets - 3 reps - 20 hold  - Seated Hamstring Stretch  - 1 x daily - 7 x weekly - 1 sets - 3 reps - 20 hold    Charges  1 Man, 2 TE

## 2025-07-31 ENCOUNTER — OFFICE VISIT (OUTPATIENT)
Dept: PHYSICAL THERAPY | Facility: HOSPITAL | Age: 70
End: 2025-07-31
Attending: FAMILY MEDICINE
Payer: MEDICARE

## 2025-07-31 PROCEDURE — 97110 THERAPEUTIC EXERCISES: CPT

## 2025-07-31 PROCEDURE — 97140 MANUAL THERAPY 1/> REGIONS: CPT

## 2025-08-05 ENCOUNTER — OFFICE VISIT (OUTPATIENT)
Dept: PHYSICAL THERAPY | Facility: HOSPITAL | Age: 70
End: 2025-08-05
Attending: FAMILY MEDICINE

## 2025-08-05 PROCEDURE — 97140 MANUAL THERAPY 1/> REGIONS: CPT

## 2025-08-05 PROCEDURE — 97110 THERAPEUTIC EXERCISES: CPT

## 2025-08-21 ENCOUNTER — OFFICE VISIT (OUTPATIENT)
Dept: PHYSICAL THERAPY | Facility: HOSPITAL | Age: 70
End: 2025-08-21
Attending: FAMILY MEDICINE

## 2025-08-21 PROCEDURE — 97110 THERAPEUTIC EXERCISES: CPT

## 2025-08-21 PROCEDURE — 97140 MANUAL THERAPY 1/> REGIONS: CPT

## 2025-08-27 ENCOUNTER — OFFICE VISIT (OUTPATIENT)
Dept: PHYSICAL THERAPY | Facility: HOSPITAL | Age: 70
End: 2025-08-27
Attending: FAMILY MEDICINE

## 2025-08-27 PROCEDURE — 97110 THERAPEUTIC EXERCISES: CPT

## (undated) DIAGNOSIS — J45.30 MILD PERSISTENT ASTHMA WITHOUT COMPLICATION: ICD-10-CM

## (undated) DIAGNOSIS — L30.9 ACUTE ECZEMA: ICD-10-CM

## (undated) DIAGNOSIS — L30.9 CHRONIC ECZEMA: ICD-10-CM

## (undated) DIAGNOSIS — Z09 FOLLOW UP: Primary | ICD-10-CM

## (undated) DEVICE — Device

## (undated) DEVICE — SOLUTION IRRIG 1000ML ST H2O AQUALITE PLAS

## (undated) DEVICE — SOLUTION IRRIG 3000ML 0.9% NACL FLX CONT

## (undated) DEVICE — SYRINGE IRRIG 70ML TOOM TIP CTRL PIST RAISE L

## (undated) DEVICE — CATHETER URETH 22FR 30CC BLLN LTX AMB WVN

## (undated) DEVICE — DEVICE SECUREMENT AD TRICOT ANCHR PD SWVL RET

## (undated) DEVICE — SPONGE GZ 4XL4IN 100% COT 12 PLY TYP VII WVN

## (undated) DEVICE — STICK SPNG 8IN MED POVIDONE IOD PAINT

## (undated) DEVICE — SYRINGE MED 30ML STD CLR PLAS LL TIP N CTRL

## (undated) DEVICE — FIBER LSR 550UM 6J 80HZ 120W DL FOR LITHO

## (undated) DEVICE — SLEEVE COMPR M KNEE LEN SGL USE KENDALL SCD

## (undated) DEVICE — GLOVE SUR 7.5 SENSICARE SLT PIP CRM PWD F

## (undated) DEVICE — PACK PBDS CYSTOSCOPY

## (undated) DEVICE — BAG URINE 4 LITER 600909

## (undated) NOTE — Clinical Note
ASTHMA ACTION PLAN for Sol Mclean     : 1955     Date: 5/15/2017  Provider:  Arpan Schwarz MD  Phone for doctor or clinic: [address] [facility], [address] [address] [phone]

## (undated) NOTE — LETTER
ASTHMA ACTION PLAN for Andie Hilario     : 1955     Date: 2022  Provider:  Carol Wagner MD  Phone for doctor or clinic: [facility], [place], [address] [place] [address]  [phone]    ACT Score: 21      You can use the colors of a traffic light to help learn about your asthma medicines. 1. Green - Go! % of Personal Best Peak Flow Use controller medicine. Breathing is good  No cough or wheeze  Can work and play Medicine How much to take When to take it    Advair 250/50 inhaler, 1 puff twice daily         2. Yellow - Caution. 50-79% Personal Best Peak  Flow. Use reliever medicine to keep an asthma attack from getting bad. Cough  Wheezing  Tight Chest  Wake up at night Medicine How much to take When to take it    Ventolin HFA (albuterol) inhaler 2 puffs every 4-6 hours as needed       Additional instructions         3. Red - Stop! Danger!  <50% Personal Best Peak  Flow. Take these medications until  Get help from a doctor   Medicine not helping  Breathing is hard and fast  Nose opens wide  Can't walk  Ribs show  Can't talk well Medicine How much to take When to take it    Ventolin HFA (albuterol) inhaler 2 puffs every 15 minutes as needed    Go to the nearest Emergency Room/Department right now! Additional Instructions If your symptoms do not improve and you cannot contact your doctor, [facility] or call 911 immediately! [x] Asthma Action Plan reviewed with patient (and caregiver if necessary) and a copy of the plan was given to the patient/caregiver. [] Asthma Action Plan reviewed with patient (and caregiver if necessary) on the phone and mailed copy to patient or submitted via [address]      Signatures:  Provider  Carol Wagner MD   Patient Caretaker

## (undated) NOTE — MR AVS SNAPSHOT
[facility], [address] [address] [phone]               Thank you for choosing us for your health care visit with Fadia Payne MD.  We are glad to serve you and happy to provide you with this kumar Assoc Dx:   Other specified hearing loss of both ears [H91.8X3]          Reason for Today's Visit     HTN           Medical Issues Discussed Today     Hearing loss    Mild asthma    Essential hypertension with goal blood pressure less than 130/85    -  Kathy Visit [facility] online at  [email or web address]

## (undated) NOTE — LETTER
ASTHMA ACTION PLAN for Chantale Bennett     : 1955     Date: 2018  Provider:  Radha Wolfe MD  Phone for doctor or clinic: [address] [facility], [address] [address] [phone]

## (undated) NOTE — MR AVS SNAPSHOT
[facility]  [address] [facility] [phone]               Thank you for choosing us for your health care visit with Casper Alvarado PA-C.   We are glad to serve you and happy to provide you with this summary of medicine such as from a hand held inhaler or aerosol breathing machine more than every 4 hours, contact your healthcare provider or seek immediate medical attention.  If prescribed an antibiotic or prednisone, take all of the medicine as prescribed, even if © 6298-4535 [address], [address]. All rights reserved. This information is not intended as a substitute for professional medical care. Always follow your healthcare professional's instructions.              Al

## (undated) NOTE — LETTER
ASTHMA ACTION PLAN for Rosas Julien     : 1955     Date: 2020  Provider:  Junaid Li MD  Phone for doctor or clinic: [facility], [address], [address] [facility] [address], [address] [phone]

## (undated) NOTE — LETTER
Date: 6/6/2025    Patient Name: Kaden Osborne          To Whom it may concern:    This letter has been written at the patient's request. The above patient was seen at [facility] for treatment of a medical condition.    This patient should be excused from attending work from 06/06/2025  through 06/22/2025 .    The patient may return to work on 06/23/2025.         Sincerely,         Onur Mclean MD

## (undated) NOTE — LETTER
[address] [place] [address] [place] [phone]  PH: [phone]  FAX: [phone]      To whom it may concern:    Kaden may return to work without restrictions, Thursday, 3/21/24.    Thank you,      Tanisha Rodriguez PA-C    3/20/2024   Kaden Osborne,  1955

## (undated) NOTE — LETTER
To Whom It May Concern:    Kaden Osborne has been under our care regarding ongoing medical issues. He will have surgery on 2/22/2024. Because of this, he will be required to restrict his physical activities.    He may resume light duty work on 3/7/2024 and usual activities including work, on 3/21/2024 with the following restrictions:    []  None     [x]    No heavy lifting (over 15 pounds) for        2       weeks from surgery date.   []    Part-time (no more than             hours per week) for               week   []  Other:        Please feel free to contact us if there are any questions.      Sincerely,      Faisal Roy MD  [facility], [address] [place] [address] [place] [id number] [phone]

## (undated) NOTE — LETTER
ASTHMA ACTION PLAN for Moose Lin     : 1955     Date: 2021  Provider:  Gustavo Mills MD  Phone for doctor or clinic: [address] [address] [address], [address] [facility] [address], [address] [phone]

## (undated) NOTE — MR AVS SNAPSHOT
[facility] [address], [address] [phone]               Thank you for choosing us for your health care visit with Katrin Paul MD.  We are glad to serve you and happy to provide you with this kumar [address], [address] [phone]     Phone:  [phone]    - Mometasone Furoate 220 MCG/INH Aepb            MyChart     Visit MyChart  You can access your MyChart to more actively manage your health care and view more details from this visit by going

## (undated) NOTE — MR AVS SNAPSHOT
[facility] [address], [address] [address] [phone]               Thank you for choosing us for your health care visit with [address] [place] [address], .   We are glad to serve you and happy to provide you with this s · Take prescribed medicine exactly at the times advised. If you need medicine such as from a hand held inhaler or aerosol breathing machine more than every 4 hours, contact your healthcare provider or seek immediate medical attention.  If prescribed an anti · Lips or fingernails turning gray or blue  Date Last Reviewed: 12/2/2015  © 1087-1002 [address], [address] [place] [address]. All rights reserved.  This information is not intended as a substitute for professional medical care

## (undated) NOTE — LETTER
Patient Name: Moose Lin  : 1955  MRN: [id number]  Patient Address: [address] [place], [place] [id number]      Coronavirus Disease 2019 (COVID-19)     [facility] is committed to the safety and well-being of our patients, members 2. Monitor your symptoms carefully. If your symptoms get worse, call your healthcare provider immediately. 3. Get rest and stay hydrated.    4. If you have a medical appointment, call the healthcare provider ahead of time and tell them that you have or may ? At least 24 hours have passed since recovery defined as resolution of fever without the use of fever-reducing medications; and  · Improvement in respiratory symptoms (e.g., cough, shortness of breath); and  · At least 10 days have passed since symptoms f If you would be interested in donating your plasma to help treat others diagnosed with the virus, please contact [facility] directly on their website: [email or web address]    Who is eligible to donate convalescent plasma?